# Patient Record
Sex: FEMALE | Race: WHITE | NOT HISPANIC OR LATINO | Employment: UNEMPLOYED
[De-identification: names, ages, dates, MRNs, and addresses within clinical notes are randomized per-mention and may not be internally consistent; named-entity substitution may affect disease eponyms.]

---

## 2017-07-08 ENCOUNTER — HEALTH MAINTENANCE LETTER (OUTPATIENT)
Age: 52
End: 2017-07-08

## 2019-10-04 ENCOUNTER — HEALTH MAINTENANCE LETTER (OUTPATIENT)
Age: 54
End: 2019-10-04

## 2020-02-10 ENCOUNTER — HEALTH MAINTENANCE LETTER (OUTPATIENT)
Age: 55
End: 2020-02-10

## 2020-11-14 ENCOUNTER — HEALTH MAINTENANCE LETTER (OUTPATIENT)
Age: 55
End: 2020-11-14

## 2021-03-28 ENCOUNTER — HEALTH MAINTENANCE LETTER (OUTPATIENT)
Age: 56
End: 2021-03-28

## 2021-09-12 ENCOUNTER — HEALTH MAINTENANCE LETTER (OUTPATIENT)
Age: 56
End: 2021-09-12

## 2022-04-24 ENCOUNTER — HEALTH MAINTENANCE LETTER (OUTPATIENT)
Age: 57
End: 2022-04-24

## 2022-10-30 ENCOUNTER — HEALTH MAINTENANCE LETTER (OUTPATIENT)
Age: 57
End: 2022-10-30

## 2023-02-23 ENCOUNTER — APPOINTMENT (OUTPATIENT)
Dept: GENERAL RADIOLOGY | Facility: CLINIC | Age: 58
DRG: 871 | End: 2023-02-23
Attending: EMERGENCY MEDICINE
Payer: MEDICARE

## 2023-02-23 ENCOUNTER — APPOINTMENT (OUTPATIENT)
Dept: CT IMAGING | Facility: CLINIC | Age: 58
DRG: 871 | End: 2023-02-23
Attending: EMERGENCY MEDICINE
Payer: MEDICARE

## 2023-02-23 ENCOUNTER — HOSPITAL ENCOUNTER (INPATIENT)
Facility: CLINIC | Age: 58
LOS: 4 days | Discharge: HOME | DRG: 871 | End: 2023-02-27
Attending: EMERGENCY MEDICINE | Admitting: INTERNAL MEDICINE
Payer: MEDICARE

## 2023-02-23 DIAGNOSIS — A41.9 SEPSIS, DUE TO UNSPECIFIED ORGANISM, UNSPECIFIED WHETHER ACUTE ORGAN DYSFUNCTION PRESENT (H): Primary | ICD-10-CM

## 2023-02-23 DIAGNOSIS — N39.0 URINARY TRACT INFECTION WITHOUT HEMATURIA, SITE UNSPECIFIED: ICD-10-CM

## 2023-02-23 DIAGNOSIS — N10 PYELONEPHRITIS, ACUTE: ICD-10-CM

## 2023-02-23 DIAGNOSIS — N90.5 VULVAR ATROPHY: ICD-10-CM

## 2023-02-23 LAB
ALBUMIN SERPL BCG-MCNC: 3.7 G/DL (ref 3.5–5.2)
ALBUMIN UR-MCNC: NEGATIVE MG/DL
ALP SERPL-CCNC: 110 U/L (ref 35–104)
ALT SERPL W P-5'-P-CCNC: 15 U/L (ref 10–35)
ANION GAP SERPL CALCULATED.3IONS-SCNC: 15 MMOL/L (ref 7–15)
APPEARANCE UR: CLEAR
AST SERPL W P-5'-P-CCNC: 20 U/L (ref 10–35)
BACTERIA #/AREA URNS HPF: ABNORMAL /HPF
BASE EXCESS BLDV CALC-SCNC: 2.1 MMOL/L (ref -7.7–1.9)
BASOPHILS # BLD AUTO: 0.1 10E3/UL (ref 0–0.2)
BASOPHILS NFR BLD AUTO: 0 %
BILIRUB SERPL-MCNC: 0.7 MG/DL
BILIRUB UR QL STRIP: NEGATIVE
BUN SERPL-MCNC: 17.9 MG/DL (ref 6–20)
CALCIUM SERPL-MCNC: 9 MG/DL (ref 8.6–10)
CHLORIDE SERPL-SCNC: 99 MMOL/L (ref 98–107)
COLOR UR AUTO: YELLOW
CREAT SERPL-MCNC: 1.09 MG/DL (ref 0.51–0.95)
DEPRECATED HCO3 PLAS-SCNC: 24 MMOL/L (ref 22–29)
EOSINOPHIL # BLD AUTO: 0 10E3/UL (ref 0–0.7)
EOSINOPHIL NFR BLD AUTO: 0 %
ERYTHROCYTE [DISTWIDTH] IN BLOOD BY AUTOMATED COUNT: 13.5 % (ref 10–15)
FLUAV RNA SPEC QL NAA+PROBE: NEGATIVE
FLUBV RNA RESP QL NAA+PROBE: NEGATIVE
GFR SERPL CREATININE-BSD FRML MDRD: 60 ML/MIN/1.73M2
GLUCOSE BLDC GLUCOMTR-MCNC: 221 MG/DL (ref 70–99)
GLUCOSE BLDC GLUCOMTR-MCNC: 240 MG/DL (ref 70–99)
GLUCOSE BLDC GLUCOMTR-MCNC: 266 MG/DL (ref 70–99)
GLUCOSE BLDC GLUCOMTR-MCNC: 290 MG/DL (ref 70–99)
GLUCOSE SERPL-MCNC: 290 MG/DL (ref 70–99)
GLUCOSE UR STRIP-MCNC: >=1000 MG/DL
HCO3 BLDV-SCNC: 26 MMOL/L (ref 21–28)
HCT VFR BLD AUTO: 41.2 % (ref 35–47)
HGB BLD-MCNC: 13.7 G/DL (ref 11.7–15.7)
HGB UR QL STRIP: ABNORMAL
HOLD SPECIMEN: NORMAL
IMM GRANULOCYTES # BLD: 0.8 10E3/UL
IMM GRANULOCYTES NFR BLD: 2 %
KETONES UR STRIP-MCNC: 10 MG/DL
LACTATE SERPL-SCNC: 1.9 MMOL/L (ref 0.7–2)
LACTATE SERPL-SCNC: 2.2 MMOL/L (ref 0.7–2)
LEUKOCYTE ESTERASE UR QL STRIP: ABNORMAL
LIPASE SERPL-CCNC: 11 U/L (ref 13–60)
LYMPHOCYTES # BLD AUTO: 1.6 10E3/UL (ref 0.8–5.3)
LYMPHOCYTES NFR BLD AUTO: 5 %
MAGNESIUM SERPL-MCNC: 1.4 MG/DL (ref 1.7–2.3)
MCH RBC QN AUTO: 29.3 PG (ref 26.5–33)
MCHC RBC AUTO-ENTMCNC: 33.3 G/DL (ref 31.5–36.5)
MCV RBC AUTO: 88 FL (ref 78–100)
MONOCYTES # BLD AUTO: 0.9 10E3/UL (ref 0–1.3)
MONOCYTES NFR BLD AUTO: 3 %
MUCOUS THREADS #/AREA URNS LPF: PRESENT /LPF
NEUTROPHILS # BLD AUTO: 31.6 10E3/UL (ref 1.6–8.3)
NEUTROPHILS NFR BLD AUTO: 90 %
NITRATE UR QL: NEGATIVE
NRBC # BLD AUTO: 0 10E3/UL
NRBC BLD AUTO-RTO: 0 /100
O2/TOTAL GAS SETTING VFR VENT: 0 %
OXYHGB MFR BLDV: 79 % (ref 70–75)
PCO2 BLDV: 37 MM HG (ref 40–50)
PH BLDV: 7.45 [PH] (ref 7.32–7.43)
PH UR STRIP: 5 [PH] (ref 5–7)
PLATELET # BLD AUTO: 229 10E3/UL (ref 150–450)
PO2 BLDV: 43 MM HG (ref 25–47)
POTASSIUM SERPL-SCNC: 3.6 MMOL/L (ref 3.4–5.3)
PROT SERPL-MCNC: 7 G/DL (ref 6.4–8.3)
RBC # BLD AUTO: 4.67 10E6/UL (ref 3.8–5.2)
RBC URINE: 2 /HPF
RSV RNA SPEC NAA+PROBE: NEGATIVE
SARS-COV-2 RNA RESP QL NAA+PROBE: NEGATIVE
SODIUM SERPL-SCNC: 138 MMOL/L (ref 136–145)
SP GR UR STRIP: 1.03 (ref 1–1.03)
SQUAMOUS EPITHELIAL: 1 /HPF
UROBILINOGEN UR STRIP-MCNC: NORMAL MG/DL
WBC # BLD AUTO: 35.1 10E3/UL (ref 4–11)
WBC URINE: 33 /HPF

## 2023-02-23 PROCEDURE — 87086 URINE CULTURE/COLONY COUNT: CPT | Performed by: EMERGENCY MEDICINE

## 2023-02-23 PROCEDURE — 71046 X-RAY EXAM CHEST 2 VIEWS: CPT

## 2023-02-23 PROCEDURE — 99285 EMERGENCY DEPT VISIT HI MDM: CPT | Mod: 25,CS

## 2023-02-23 PROCEDURE — 258N000003 HC RX IP 258 OP 636: Performed by: PHYSICIAN ASSISTANT

## 2023-02-23 PROCEDURE — G1010 CDSM STANSON: HCPCS

## 2023-02-23 PROCEDURE — 36415 COLL VENOUS BLD VENIPUNCTURE: CPT | Performed by: EMERGENCY MEDICINE

## 2023-02-23 PROCEDURE — 82962 GLUCOSE BLOOD TEST: CPT

## 2023-02-23 PROCEDURE — 250N000013 HC RX MED GY IP 250 OP 250 PS 637: Performed by: PHYSICIAN ASSISTANT

## 2023-02-23 PROCEDURE — 99222 1ST HOSP IP/OBS MODERATE 55: CPT | Mod: AI | Performed by: PHYSICIAN ASSISTANT

## 2023-02-23 PROCEDURE — 83735 ASSAY OF MAGNESIUM: CPT | Performed by: EMERGENCY MEDICINE

## 2023-02-23 PROCEDURE — C9803 HOPD COVID-19 SPEC COLLECT: HCPCS

## 2023-02-23 PROCEDURE — 82805 BLOOD GASES W/O2 SATURATION: CPT | Performed by: EMERGENCY MEDICINE

## 2023-02-23 PROCEDURE — 96365 THER/PROPH/DIAG IV INF INIT: CPT | Mod: 59

## 2023-02-23 PROCEDURE — 258N000003 HC RX IP 258 OP 636: Performed by: EMERGENCY MEDICINE

## 2023-02-23 PROCEDURE — 83690 ASSAY OF LIPASE: CPT | Performed by: EMERGENCY MEDICINE

## 2023-02-23 PROCEDURE — 87637 SARSCOV2&INF A&B&RSV AMP PRB: CPT | Performed by: EMERGENCY MEDICINE

## 2023-02-23 PROCEDURE — 96375 TX/PRO/DX INJ NEW DRUG ADDON: CPT

## 2023-02-23 PROCEDURE — 250N000011 HC RX IP 250 OP 636: Performed by: EMERGENCY MEDICINE

## 2023-02-23 PROCEDURE — 70450 CT HEAD/BRAIN W/O DYE: CPT | Mod: MF

## 2023-02-23 PROCEDURE — 83605 ASSAY OF LACTIC ACID: CPT | Performed by: EMERGENCY MEDICINE

## 2023-02-23 PROCEDURE — 74177 CT ABD & PELVIS W/CONTRAST: CPT | Mod: MG

## 2023-02-23 PROCEDURE — 120N000001 HC R&B MED SURG/OB

## 2023-02-23 PROCEDURE — 87149 DNA/RNA DIRECT PROBE: CPT | Performed by: EMERGENCY MEDICINE

## 2023-02-23 PROCEDURE — 85025 COMPLETE CBC W/AUTO DIFF WBC: CPT | Performed by: EMERGENCY MEDICINE

## 2023-02-23 PROCEDURE — 81001 URINALYSIS AUTO W/SCOPE: CPT | Performed by: EMERGENCY MEDICINE

## 2023-02-23 PROCEDURE — 87077 CULTURE AEROBIC IDENTIFY: CPT | Performed by: EMERGENCY MEDICINE

## 2023-02-23 PROCEDURE — 250N000011 HC RX IP 250 OP 636: Performed by: PHYSICIAN ASSISTANT

## 2023-02-23 PROCEDURE — 80053 COMPREHEN METABOLIC PANEL: CPT | Performed by: EMERGENCY MEDICINE

## 2023-02-23 PROCEDURE — 250N000013 HC RX MED GY IP 250 OP 250 PS 637: Performed by: EMERGENCY MEDICINE

## 2023-02-23 PROCEDURE — 250N000009 HC RX 250: Performed by: EMERGENCY MEDICINE

## 2023-02-23 RX ORDER — NICOTINE POLACRILEX 4 MG
15-30 LOZENGE BUCCAL
Status: DISCONTINUED | OUTPATIENT
Start: 2023-02-23 | End: 2023-02-27 | Stop reason: HOSPADM

## 2023-02-23 RX ORDER — DULOXETIN HYDROCHLORIDE 60 MG/1
60 CAPSULE, DELAYED RELEASE ORAL DAILY
COMMUNITY

## 2023-02-23 RX ORDER — DIPHENHYDRAMINE HYDROCHLORIDE 50 MG/ML
25 INJECTION INTRAMUSCULAR; INTRAVENOUS ONCE
Status: COMPLETED | OUTPATIENT
Start: 2023-02-23 | End: 2023-02-23

## 2023-02-23 RX ORDER — MORPHINE SULFATE 4 MG/ML
4 INJECTION, SOLUTION INTRAMUSCULAR; INTRAVENOUS ONCE
Status: COMPLETED | OUTPATIENT
Start: 2023-02-23 | End: 2023-02-23

## 2023-02-23 RX ORDER — SODIUM CHLORIDE 9 MG/ML
INJECTION, SOLUTION INTRAVENOUS CONTINUOUS
Status: DISCONTINUED | OUTPATIENT
Start: 2023-02-23 | End: 2023-02-24

## 2023-02-23 RX ORDER — DEXTROSE MONOHYDRATE 25 G/50ML
25-50 INJECTION, SOLUTION INTRAVENOUS
Status: DISCONTINUED | OUTPATIENT
Start: 2023-02-23 | End: 2023-02-27 | Stop reason: HOSPADM

## 2023-02-23 RX ORDER — OXYCODONE HYDROCHLORIDE 5 MG/1
5 TABLET ORAL EVERY 4 HOURS PRN
Status: DISCONTINUED | OUTPATIENT
Start: 2023-02-23 | End: 2023-02-25

## 2023-02-23 RX ORDER — ONDANSETRON 4 MG/1
4 TABLET, ORALLY DISINTEGRATING ORAL EVERY 6 HOURS PRN
Status: DISCONTINUED | OUTPATIENT
Start: 2023-02-23 | End: 2023-02-27 | Stop reason: HOSPADM

## 2023-02-23 RX ORDER — AMOXICILLIN 250 MG
2 CAPSULE ORAL 2 TIMES DAILY PRN
Status: DISCONTINUED | OUTPATIENT
Start: 2023-02-23 | End: 2023-02-27 | Stop reason: HOSPADM

## 2023-02-23 RX ORDER — METOCLOPRAMIDE HYDROCHLORIDE 5 MG/ML
10 INJECTION INTRAMUSCULAR; INTRAVENOUS ONCE
Status: COMPLETED | OUTPATIENT
Start: 2023-02-23 | End: 2023-02-23

## 2023-02-23 RX ORDER — CEFTRIAXONE 1 G/1
1 INJECTION, POWDER, FOR SOLUTION INTRAMUSCULAR; INTRAVENOUS ONCE
Status: COMPLETED | OUTPATIENT
Start: 2023-02-23 | End: 2023-02-23

## 2023-02-23 RX ORDER — IBUPROFEN 200 MG
600 TABLET ORAL PRN
COMMUNITY

## 2023-02-23 RX ORDER — LIDOCAINE 40 MG/G
CREAM TOPICAL
Status: DISCONTINUED | OUTPATIENT
Start: 2023-02-23 | End: 2023-02-27 | Stop reason: HOSPADM

## 2023-02-23 RX ORDER — PREGABALIN 100 MG/1
100 CAPSULE ORAL 2 TIMES DAILY
Status: DISCONTINUED | OUTPATIENT
Start: 2023-02-23 | End: 2023-02-27 | Stop reason: HOSPADM

## 2023-02-23 RX ORDER — DULOXETIN HYDROCHLORIDE 60 MG/1
60 CAPSULE, DELAYED RELEASE ORAL DAILY
Status: DISCONTINUED | OUTPATIENT
Start: 2023-02-23 | End: 2023-02-27 | Stop reason: HOSPADM

## 2023-02-23 RX ORDER — ACETAMINOPHEN 325 MG/1
650 TABLET ORAL EVERY 6 HOURS PRN
Status: DISCONTINUED | OUTPATIENT
Start: 2023-02-23 | End: 2023-02-27 | Stop reason: HOSPADM

## 2023-02-23 RX ORDER — IOPAMIDOL 755 MG/ML
500 INJECTION, SOLUTION INTRAVASCULAR ONCE
Status: COMPLETED | OUTPATIENT
Start: 2023-02-23 | End: 2023-02-23

## 2023-02-23 RX ORDER — AMOXICILLIN 250 MG
1 CAPSULE ORAL 2 TIMES DAILY PRN
Status: DISCONTINUED | OUTPATIENT
Start: 2023-02-23 | End: 2023-02-27 | Stop reason: HOSPADM

## 2023-02-23 RX ORDER — ACETAMINOPHEN 325 MG/1
975 TABLET ORAL ONCE
Status: COMPLETED | OUTPATIENT
Start: 2023-02-23 | End: 2023-02-23

## 2023-02-23 RX ORDER — ACETAMINOPHEN 650 MG/1
650 SUPPOSITORY RECTAL EVERY 6 HOURS PRN
Status: DISCONTINUED | OUTPATIENT
Start: 2023-02-23 | End: 2023-02-27 | Stop reason: HOSPADM

## 2023-02-23 RX ORDER — ONDANSETRON 2 MG/ML
4 INJECTION INTRAMUSCULAR; INTRAVENOUS EVERY 6 HOURS PRN
Status: DISCONTINUED | OUTPATIENT
Start: 2023-02-23 | End: 2023-02-27 | Stop reason: HOSPADM

## 2023-02-23 RX ORDER — HYDROMORPHONE HCL IN WATER/PF 6 MG/30 ML
0.2 PATIENT CONTROLLED ANALGESIA SYRINGE INTRAVENOUS
Status: DISCONTINUED | OUTPATIENT
Start: 2023-02-23 | End: 2023-02-25

## 2023-02-23 RX ORDER — CEFTRIAXONE 1 G/1
1 INJECTION, POWDER, FOR SOLUTION INTRAMUSCULAR; INTRAVENOUS EVERY 24 HOURS
Status: DISCONTINUED | OUTPATIENT
Start: 2023-02-24 | End: 2023-02-24

## 2023-02-23 RX ORDER — POLYETHYLENE GLYCOL 3350 17 G/17G
17 POWDER, FOR SOLUTION ORAL DAILY PRN
Status: DISCONTINUED | OUTPATIENT
Start: 2023-02-23 | End: 2023-02-27 | Stop reason: HOSPADM

## 2023-02-23 RX ORDER — METFORMIN HCL 500 MG
1000 TABLET, EXTENDED RELEASE 24 HR ORAL
COMMUNITY

## 2023-02-23 RX ORDER — LORAZEPAM 2 MG/ML
.5-1 INJECTION INTRAMUSCULAR EVERY 4 HOURS PRN
Status: DISCONTINUED | OUTPATIENT
Start: 2023-02-23 | End: 2023-02-25

## 2023-02-23 RX ORDER — MAGNESIUM SULFATE HEPTAHYDRATE 40 MG/ML
2 INJECTION, SOLUTION INTRAVENOUS ONCE
Status: COMPLETED | OUTPATIENT
Start: 2023-02-23 | End: 2023-02-23

## 2023-02-23 RX ADMIN — DIPHENHYDRAMINE HYDROCHLORIDE 25 MG: 50 INJECTION INTRAMUSCULAR; INTRAVENOUS at 10:53

## 2023-02-23 RX ADMIN — PREGABALIN 100 MG: 100 CAPSULE ORAL at 20:27

## 2023-02-23 RX ADMIN — SODIUM CHLORIDE, POTASSIUM CHLORIDE, SODIUM LACTATE AND CALCIUM CHLORIDE 1000 ML: 600; 310; 30; 20 INJECTION, SOLUTION INTRAVENOUS at 10:34

## 2023-02-23 RX ADMIN — SODIUM CHLORIDE: 9 INJECTION, SOLUTION INTRAVENOUS at 16:21

## 2023-02-23 RX ADMIN — MAGNESIUM SULFATE HEPTAHYDRATE 2 G: 2 INJECTION, SOLUTION INTRAVENOUS at 11:01

## 2023-02-23 RX ADMIN — IOPAMIDOL 91 ML: 755 INJECTION, SOLUTION INTRAVENOUS at 11:12

## 2023-02-23 RX ADMIN — DULOXETINE HYDROCHLORIDE 60 MG: 60 CAPSULE, DELAYED RELEASE ORAL at 16:14

## 2023-02-23 RX ADMIN — METOCLOPRAMIDE HYDROCHLORIDE 10 MG: 5 INJECTION INTRAMUSCULAR; INTRAVENOUS at 10:54

## 2023-02-23 RX ADMIN — MORPHINE SULFATE 4 MG: 4 INJECTION, SOLUTION INTRAMUSCULAR; INTRAVENOUS at 12:14

## 2023-02-23 RX ADMIN — EMPAGLIFLOZIN 10 MG: 10 TABLET, FILM COATED ORAL at 16:14

## 2023-02-23 RX ADMIN — HYDROMORPHONE HYDROCHLORIDE 0.2 MG: 0.2 INJECTION, SOLUTION INTRAMUSCULAR; INTRAVENOUS; SUBCUTANEOUS at 22:49

## 2023-02-23 RX ADMIN — CEFTRIAXONE 1 G: 1 INJECTION, POWDER, FOR SOLUTION INTRAMUSCULAR; INTRAVENOUS at 11:56

## 2023-02-23 RX ADMIN — SODIUM CHLORIDE 63 ML: 9 INJECTION, SOLUTION INTRAVENOUS at 11:12

## 2023-02-23 RX ADMIN — ACETAMINOPHEN 975 MG: 325 TABLET ORAL at 13:09

## 2023-02-23 ASSESSMENT — ENCOUNTER SYMPTOMS
NECK PAIN: 0
CHEST TIGHTNESS: 0
DIZZINESS: 1
VOMITING: 1
RHINORRHEA: 1
EYE PAIN: 0
ABDOMINAL PAIN: 1
HEMATURIA: 0
NAUSEA: 1
CHILLS: 1
BACK PAIN: 1
PALPITATIONS: 0
FEVER: 0
SHORTNESS OF BREATH: 0
HEADACHES: 1
DIARRHEA: 0
DYSURIA: 0
MYALGIAS: 1
COLOR CHANGE: 0
NECK STIFFNESS: 0
COUGH: 0

## 2023-02-23 ASSESSMENT — ACTIVITIES OF DAILY LIVING (ADL)
ADLS_ACUITY_SCORE: 35
ADLS_ACUITY_SCORE: 23
DIFFICULTY_EATING/SWALLOWING: NO
TOILETING_ISSUES: NO
DOING_ERRANDS_INDEPENDENTLY_DIFFICULTY: NO
ADLS_ACUITY_SCORE: 22
ADLS_ACUITY_SCORE: 35
WALKING_OR_CLIMBING_STAIRS_DIFFICULTY: NO
DRESSING/BATHING_DIFFICULTY: NO
ADLS_ACUITY_SCORE: 35

## 2023-02-23 NOTE — ED TRIAGE NOTES
PT arrives via EMS from home, EMS reports that pt has been having N/V/D, lower back pain, headache, dizziness since Tuesday. PT BEFAST negative and VSS

## 2023-02-23 NOTE — UTILIZATION REVIEW
Admission Status; Secondary Review Determination       Under the authority of the Utilization Management Committee, the utilization review process indicated a secondary review on the above patient. The review outcome is based on review of the medical records, discussions with staff, and applying clinical experience noted on the date of the review.     (x) Inpatient Status Appropriate - This patient's medical care is consistent with medical management for inpatient care and reasonable inpatient medical practice.     RATIONALE FOR DETERMINATION     Patient requires inpatient admission versus short stay observation or outpatient treatment for the following reasons:  57 year old female with dm2, fibromyalgia, who was admitted with LBP found to have pyelonephritis with right hydronephrosis and mild acute kidney injury with creatinine of 1.1 with last of 0.75. Of note, her white cell count was 35 on admission and she was tachycardic, but note febrile. There is concern for sepsis from the pyelonephritis. Given this, > 2 midnights is expected and would continue inpatient care.    The expected length of stay at the time of admission was more than 2 nights because of the severity of illness, intensity of service provided, and risk for adverse outcome. Inpatient admission is appropriate.         This document was produced using voice recognition software       The information on this document is developed by the utilization review team in order for the business office to ensure compliance. This only denotes the appropriateness of proper admission status and does not reflect the quality of care rendered.   The definitions of Inpatient Status and Observation Status used in making the determination above are those provided in the CMS Coverage Manual, Chapter 1 and Chapter 6, section 70.4.   Sincerely,   Stepan Phillips DO  Physician Advisor  [facility].

## 2023-02-23 NOTE — PLAN OF CARE
Patient Transfer Information  Patient connected to monitoring equipment on arrival: N/A    Patient connected to wall oxygen on arrival: Yes    Belongings: Transferred with patient    Safety check completed: Yes

## 2023-02-23 NOTE — ED NOTES
[facility]  ED Nurse Handoff Report    Britt Holder is a 57 year old female   ED Chief complaint: Nausea, Vomiting, & Diarrhea and Headache  . ED Diagnosis:   Final diagnoses:   Sepsis, due to unspecified organism, unspecified whether acute organ dysfunction present (H)   Pyelonephritis, acute   Urinary tract infection without hematuria, site unspecified     Allergies:   Allergies   Allergen Reactions     Blood-Group Specific Substance      Patient has a Warm Auto Antibody. Blood products may be delayed. Draw 2 purple and 1 red tube for each Type and Screen and/or Type and Crossmatch order.     Compazine      Cortisone Hives     Including injectable     Cortizone      Dicyclomine      Gabapentin      Imitrex [Sumatriptan] Swelling     Sulfa Drugs Rash       Code Status: Full Code  Activity level - Baseline/Home:  Independent. Activity Level - Current:   Independent. Lift room needed: No. Bariatric: No   Needed: No   Isolation: No. Infection: Not Applicable.     Vital Signs:   Vitals:    02/23/23 0934 02/23/23 1036 02/23/23 1257 02/23/23 1310   BP: 115/73   (!) 145/78   Pulse: 111   (!) 126   Resp: 20      Temp:  98.6  F (37  C) 99.9  F (37.7  C)    TempSrc:  Oral Oral    SpO2: 95%   95%       Cardiac Rhythm:  ,      Pain level:    Patient confused: No. Patient Falls Risk: No.   Elimination Status: Has voided   Patient Report - Initial Complaint: Nausea, Vomiting, Diarrhea and Headache. Focused Assessment: Britt Holder is a 57 year old female with a history of migraines, hyperglycemia, fibromyalgia, and depression who presents with nausea, vomiting, body aches, and abdominal pain.  Patient states that since Tuesday, she has had headache, nausea, vertigo, and low back/body aches.  Patient states that she has not been able to keep down any fluids.  Patient endorses associated abdominal pain.  Patient denies any dysuria/hematuria.  Patient denies any diarrhea.  Patient denies any cough, but  endorses phlegm production.  Patient endorses transient history of shortness of breath a few days ago, but this denies any shortness of breath at this time.  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   Tests Performed: EKGs,LABs, IMAGING. Abnormal Results:   Labs Ordered and Resulted from Time of ED Arrival to Time of ED Departure   COMPREHENSIVE METABOLIC PANEL - Abnormal       Result Value    Sodium 138      Potassium 3.6      Chloride 99      Carbon Dioxide (CO2) 24      Anion Gap 15      Urea Nitrogen 17.9      Creatinine 1.09 (*)     Calcium 9.0      Glucose 290 (*)     Alkaline Phosphatase 110 (*)     AST 20      ALT 15      Protein Total 7.0      Albumin 3.7      Bilirubin Total 0.7      GFR Estimate 60 (*)    LIPASE - Abnormal    Lipase 11 (*)    MAGNESIUM - Abnormal    Magnesium 1.4 (*)    ROUTINE UA WITH MICROSCOPIC REFLEX TO CULTURE - Abnormal    Color Urine Yellow      Appearance Urine Clear      Glucose Urine >=1000 (*)     Bilirubin Urine Negative      Ketones Urine 10 (*)     Specific Gravity Urine 1.032      Blood Urine Trace (*)     pH Urine 5.0      Protein Albumin Urine Negative      Urobilinogen Urine Normal      Nitrite Urine Negative      Leukocyte Esterase Urine Small (*)     Bacteria Urine Few (*)     Mucus Urine Present (*)     RBC Urine 2      WBC Urine 33 (*)     Squamous Epithelials Urine 1     GLUCOSE BY METER - Abnormal    GLUCOSE BY METER POCT 266 (*)    CBC WITH PLATELETS AND DIFFERENTIAL - Abnormal    WBC Count 35.1 (*)     RBC Count 4.67      Hemoglobin 13.7      Hematocrit 41.2      MCV 88      MCH 29.3      MCHC 33.3      RDW 13.5      Platelet Count 229      % Neutrophils 90      % Lymphocytes 5      % Monocytes 3      % Eosinophils 0      % Basophils 0      % Immature Granulocytes 2      NRBCs per 100 WBC 0      Absolute Neutrophils 31.6 (*)     Absolute Lymphocytes 1.6      Absolute Monocytes 0.9      Absolute Eosinophils 0.0      Absolute  Basophils 0.1      Absolute Immature Granulocytes 0.8 (*)     Absolute NRBCs 0.0     LACTIC ACID WHOLE BLOOD - Abnormal    Lactic Acid 2.2 (*)    BLOOD GAS VENOUS WITH OXYHEMOGLOBIN - Abnormal    pH Venous 7.45 (*)     pCO2 Venous 37 (*)     pO2 Venous 43      Bicarbonate Venous 26      FIO2 0      Oxyhemoglobin Venous 79 (*)     Base Excess/Deficit (+/-) 2.1 (*)    INFLUENZA A/B & SARS-COV2 PCR MULTIPLEX - Normal    Influenza A PCR Negative      Influenza B PCR Negative      RSV PCR Negative      SARS CoV2 PCR Negative     LACTIC ACID WHOLE BLOOD - Normal    Lactic Acid 1.9     GLUCOSE MONITOR NURSING POCT   GLUCOSE MONITOR NURSING POCT   GLUCOSE MONITOR NURSING POCT   GLUCOSE MONITOR NURSING POCT   BLOOD CULTURE   BLOOD CULTURE   URINE CULTURE      XR Chest 2 Views   Final Result   IMPRESSION: There are no acute infiltrates. The cardiac silhouette is   not enlarged. Pulmonary vasculature is unremarkable.      CATHY SILVA MD            SYSTEM ID:  [id number]      CT Head w/o Contrast   Final Result   IMPRESSION: Diffuse cerebral volume loss and cerebral white matter   changes consistent with chronic small vessel ischemic disease. No   evidence for acute intracranial pathology.             Radiation dose for this scan was reduced using automated exposure   control, adjustment of the mA and/or kV according to patient size, or   iterative reconstruction technique      MARZENA HOLDEN MD            SYSTEM ID:  [id number]      CT Abdomen Pelvis w Contrast   Final Result   IMPRESSION:    1.  Right-sided perinephric stranding, mild hydronephrosis, and   urothelial enhancement concerning for pyelonephritis, clinical   correlation needed. Alternatively, this appearance could be related to   recently passed stone.      CATHY SILVA MD            SYSTEM ID:  [id number]          Treatments provided: See MAR  Family Comments: N/A  OBS brochure/video discussed/provided to patient:  Yes  ED Medications:   Medications   cefTRIAXone  (ROCEPHIN) 1 g vial to attach to  mL bag for ADULTS or NS 50 mL bag for PEDS (has no administration in time range)   lidocaine 1 % 0.1-1 mL (has no administration in time range)   lidocaine (LMX4) cream (has no administration in time range)   sodium chloride (PF) 0.9% PF flush 3 mL (has no administration in time range)   sodium chloride (PF) 0.9% PF flush 3 mL (has no administration in time range)   melatonin tablet 1 mg (has no administration in time range)   glucose gel 15-30 g (has no administration in time range)     Or   dextrose 50 % injection 25-50 mL (has no administration in time range)     Or   glucagon injection 1 mg (has no administration in time range)   sodium chloride 0.9% infusion (has no administration in time range)   acetaminophen (TYLENOL) tablet 650 mg (has no administration in time range)     Or   acetaminophen (TYLENOL) Suppository 650 mg (has no administration in time range)   oxyCODONE (ROXICODONE) tablet 5 mg (has no administration in time range)   HYDROmorphone (DILAUDID) injection 0.2 mg (has no administration in time range)   senna-docusate (SENOKOT-S/PERICOLACE) 8.6-50 MG per tablet 1 tablet (has no administration in time range)     Or   senna-docusate (SENOKOT-S/PERICOLACE) 8.6-50 MG per tablet 2 tablet (has no administration in time range)   polyethylene glycol (MIRALAX) Packet 17 g (has no administration in time range)   ondansetron (ZOFRAN ODT) ODT tab 4 mg (has no administration in time range)     Or   ondansetron (ZOFRAN) injection 4 mg (has no administration in time range)   insulin aspart (NovoLOG) injection (RAPID ACTING) (has no administration in time range)   insulin aspart (NovoLOG) injection (RAPID ACTING) (has no administration in time range)   LORazepam (ATIVAN) injection 0.5-1 mg (has no administration in time range)   lactated ringers BOLUS 1,000 mL (0 mLs Intravenous Stopped 2/23/23 1130)   metoclopramide (REGLAN) injection 10 mg (10 mg Intravenous $Given 2/23/23  1054)   diphenhydrAMINE (BENADRYL) injection 25 mg (25 mg Intravenous $Given 2/23/23 1053)   cefTRIAXone (ROCEPHIN) 1 g vial to attach to  mL bag for ADULTS or NS 50 mL bag for PEDS (0 g Intravenous Stopped 2/23/23 1230)   magnesium sulfate 2 g in 50 mL sterile water intermittent infusion (0 g Intravenous Stopped 2/23/23 1201)   CT SCAN FLUSH (63 mLs Intravenous $Given 2/23/23 1112)   iopamidol (ISOVUE-370) solution 500 mL (91 mLs Intravenous $Given 2/23/23 1112)   morphine (PF) injection 4 mg (4 mg Intravenous $Given 2/23/23 1214)   acetaminophen (TYLENOL) tablet 975 mg (975 mg Oral $Given 2/23/23 1309)     Drips infusing:  No  For the majority of the shift, the patient's behavior Green. Interventions performed were none.    Sepsis treatment initiated: No     Patient tested for COVID 19 prior to admission: YES    ED Nurse Name/Phone Number: Kervin Reilly RN,   1:58 PM    RECEIVING UNIT ED HANDOFF REVIEW    Above ED Nurse Handoff Report was reviewed: Yes  Reviewed by: Kourtney Arthur RN on February 23, 2023 at 4:23 PM

## 2023-02-23 NOTE — H&P
[facility]    History and Physical - Hospitalist Service       Date of Admission:  2/23/2023    Assessment & Plan      Britt Holder is a 57 year old female with PMHx significant for DM type II, fibromyalgia, IBS and hx of migraines, who was admitted on 2/23/2023 with pyelonephritis.     1. Pyelonephritis   Sepsis due to UTI  2 days of low back pain that radiated towards the front, nausea, vomiting and headache. Unable to tolerate oral intake. Notes severe ha but different than typical migraines. Notes some vertigo as well. Hx of UTIs.   Tachycardic here but afebrile. Mild Marco noted, Cr 1.09, previously within normal ranges. Lactic acid 2.2 and WBC 35.1. UA small LE with 33 WBCs and few bacteria, trace blood. Urine and blood cultures sent. CT abd/pelvis shows R perinephric stranding and mild hydronephrosis, evidence consistent with recently passed stone. Given IV Rocephin  - admit to inpatient  - continue IV Rocephin  - continue IVFs and supportive cares  - pain control  - monitor culture data  - given significant WBC and equivocal UA with imaging findings that could be consistent with stone passage and CT was done with contrast, will consult Urology     Addendum: Urology reviewed images, no stone seen. Consider straight cath to empty bladder if retaining. Urology will see in AM unless pt becomes unstable.     - recheck labs in AM  - if remains vertiginous, consider advanced imaging    2. MARCO  Due to UTI and possibly recent obstructing stone per CT findings.   - IVFs  - recheck in AM  3. Leukocytosis  Severe, 35.1 with left shift. Likely due to #1 only but will trend to ensure it improves  4. DM type II  Hyperglycemic, 290, likely due to infection. Managed with Metformin and Jardiance at home.   - hold Metformin until lactic acid improves, resume Jardiance   - cover with sliding scale insulin   - hypoglycemia protocol  5. Fibromyalgia  Resume home Cymbalta and Lyrica  6. Hypomagnesemia   1.4,  likely due to vomiting.   - replace per protocol     Diet:   Regular diet  DVT Prophylaxis: Pneumatic Compression Devices  Baldwin Catheter: Not present  Lines: None     Cardiac Monitoring: None  Code Status:   Full code    Clinically Significant Risk Factors Present on Admission            # Hypomagnesemia: Lowest Mg = 1.4 mg/dL in last 2 days, will replace as needed                    Disposition Plan      Expected Discharge Date: 02/25/2023                The patient's care was discussed with the Attending Physician, Dr. Spangler, Patient and ED provider, Dr. Montero.    Goldie Torres PA-C  Hospitalist Service  [facility]  Securely message with [employer] (more info)  Text page via [facility] Paging/Directory     ______________________________________________________________________    Chief Complaint   Back pain, nausea, vomiting, ha    History is obtained from the patient    History of Present Illness   Britt Holder is a 57 year old female with PMHx significant for DM type II, fibromyalgia, IBS and hx of migraines, who presents to the ED with multiple symptoms.  She complains most of low back pain and headache.  She notes a 2-day history of low back pain that radiates towards the front with nausea and vomiting.  She has been unable to keep anything down at home.  She also notes a severe headache, she does have a history of migraines but this feels different.  She notes chills with associated shortness of breath but denies measured fever. She also notes vertiginous dizziness. She denies chest pain, diarrhea, dysuria or hematuria.  She does report a history of urinary tract infections.  In the ED, she is tachycardic here but afebrile.  Vital signs otherwise stable.  CMP shows mildly elevated creatinine at 1.09 and glucose elevated at 290, otherwise unremarkable.  Magnesium is mildly low at 1.4. Lactic acid 2.2 and WBC 35.1. UA small LE with 33 WBCs and few bacteria, trace blood. Urine and blood  cultures sent. CT abd/pelvis shows R perinephric stranding and mild hydronephrosis, evidence consistent with recently passed stone.  She was given 1 L LR bolus, 2 g mag sulfate, 25 mg IV Benadryl, 10 mg IV Reglan, 4 mg IV morphine, and 175 mg of p.o. Tylenol, and IV Rocephin.  She will be admitted for further management of sepsis associated with pyelonephritis.      Past Medical History    Past Medical History:   Diagnosis Date     Fibromyalgia      Irritable bowel syndrome      Migraines        Past Surgical History   Past Surgical History:   Procedure Laterality Date     addenoidectomy       APPENDECTOMY       GYN SURGERY      hYSTERECTOMY     HYSTERECTOMY       TONSILLECTOMY         Prior to Admission Medications   Prior to Admission Medications   Prescriptions Last Dose Informant Patient Reported? Taking?   Pregabalin (LYRICA PO)   Yes No   Sig: Take 100 mg by mouth 2 times daily    TRAZODONE HCL PO   Yes No   Sig: Take 50 mg by mouth At Bedtime   venlafaxine (EFFEXOR-ER) 150 MG TB24 24 hr tablet   Yes No   Sig: Take 150 mg by mouth daily (with breakfast)      Facility-Administered Medications: None       tobacco use - 3 per day recently    Physical Exam   Vital Signs: Temp: 98.6  F (37  C) Temp src: Oral BP: 115/73 Pulse: 111   Resp: 20 SpO2: 95 % O2 Device: None (Room air)    Weight: 0 lbs 0 oz    GENERAL:  Comfortable.  PSYCH: pleasant, oriented, No acute distress.  HEENT:  Atraumatic, normocephalic. Normal conjunctiva, normal hearing, and oropharynx is normal.  NECK:  Supple, no neck vein distention  HEART:  Normal S1, S2 with no murmur, no pericardial rub, gallops or S3 or S4.  LUNGS:  Clear to auscultation, normal Respiratory effort. No wheezing, rales or ronchi.  GI:  Soft, normal bowel sounds. R low back/flank tenderness, non distended.   EXTREMITIES:  No pedal edema, +2 pulses bilateral and equal.  SKIN:  Dry to touch, No rash, wound or ulcerations.  NEUROLOGIC:  CN 2-12 intact, BL 5/5 symmetric upper  and lower extremity strength, sensation is intact with no focal deficits.      Medical Decision Making       60 MINUTES SPENT BY ME on the date of service doing chart review, history, exam, documentation & further activities per the note.      Data     I have personally reviewed the following data over the past 24 hrs:    35.1 (H)  \   13.7   / 229     138 99 17.9 /  290 (H)   3.6 24 1.09 (H) \       ALT: 15 AST: 20 AP: 110 (H) TBILI: 0.7   ALB: 3.7 TOT PROTEIN: 7.0 LIPASE: 11 (L)       Procal: N/A CRP: N/A Lactic Acid: 1.9         Imaging results reviewed over the past 24 hrs:   Recent Results (from the past 24 hour(s))   CT Abdomen Pelvis w Contrast    Narrative    CT ABDOMEN AND PELVIS WITH CONTRAST 2/23/2023 11:27 AM    CLINICAL HISTORY: Abdominal pain.     TECHNIQUE: CT scan of the abdomen and pelvis was performed following  injection of IV contrast. Multiplanar reformats were obtained. Dose  reduction techniques were used.  CONTRAST: 91mL Isovue-370    COMPARISON: December 8, 2016    FINDINGS:   LOWER CHEST: Minimal dependent probable atelectasis on the right,  trace pleural fluid on the right. Left lung base clear.    HEPATOBILIARY: Normal contour with no significant mass. No bile duct  dilatation. Calcified gallstones. Moderate hepatic steatosis.    PANCREAS: No significant mass, duct dilatation, or inflammatory  change.    SPLEEN: Normal size.    ADRENAL GLANDS: No significant nodules.    KIDNEYS/BLADDER: Perinephric stranding and urothelial enhancement on  the right with areas of decreased enhancement in the renal parenchyma  concerning for pyelonephritis. Left kidney unremarkable. No  urolithiasis demonstrated.    BOWEL: No obstruction or inflammatory change.    PELVIC ORGANS: No pelvic masses.    ADDITIONAL FINDINGS: No free air. No free fluid. A few mildly  prominent retroperitoneal lymph nodes are noted, but these are similar  to previous and may be reactive in this setting.    MUSCULOSKELETAL: No  frankly destructive bony lesions.      Impression    IMPRESSION:   1.  Right-sided perinephric stranding, mild hydronephrosis, and  urothelial enhancement concerning for pyelonephritis, clinical  correlation needed. Alternatively, this appearance could be related to  recently passed stone.    CATHY SILVA MD         SYSTEM ID:  [id number]   CT Head w/o Contrast    Narrative    CT OF THE HEAD WITHOUT CONTRAST 2/23/2023 11:28 AM     COMPARISON: None    HISTORY: Headache. Acute headache (< three months), no complicating  features.    TECHNIQUE: 5 mm thick axial CT images of the head were acquired  without IV contrast material.    FINDINGS: There is mild diffuse cerebral volume loss. There are subtle  patchy areas of decreased density in the cerebral white matter  bilaterally that are consistent with sequela of chronic small vessel  ischemic disease.     The ventricles and basal cisterns are within normal limits in  configuration given the degree of cerebral volume loss. There is no  midline shift. There are no extra-axial fluid collections.     No intracranial hemorrhage, mass or recent infarct.    The visualized paranasal sinuses are well-aerated. There is no  mastoiditis. There are no fractures of the visualized bones.       Impression    IMPRESSION: Diffuse cerebral volume loss and cerebral white matter  changes consistent with chronic small vessel ischemic disease. No  evidence for acute intracranial pathology.         Radiation dose for this scan was reduced using automated exposure  control, adjustment of the mA and/or kV according to patient size, or  iterative reconstruction technique     XR Chest 2 Views    Narrative    CHEST TWO VIEWS 2/23/2023 11:44 AM     HISTORY: Nausea, vomiting, diarrhea, and headache    COMPARISON: None.       Impression    IMPRESSION: There are no acute infiltrates. The cardiac silhouette is  not enlarged. Pulmonary vasculature is unremarkable.    CATHY SILVA MD         SYSTEM ID:   [id number]

## 2023-02-23 NOTE — PROGRESS NOTES
UROLOGY NOTE    Britt Holder is a 57 year-old female with no prior urologic history who presented to the ER with nausea, vomiting, fever, and headache. Workup was remarkable for tachycardia to the 120s, WBC 35, Cr 1.09 (baseline 0.7), and UA with 33 wbc and small LE. Contrast CT with bilateral duplex collecting systems and mild fullness of right sided renal pelves and perinephric stranging. She was admitted to the medicine service for IV antibiotics for presumed pyelonephritis.     Urology was consulted for recommendations related to her mild right hydronephrosis. No stone is appreciated on her CT scan. Her hydronephrosis may be a result of pyelonephritis itself or 2/2 recently passed stone. Given these findings, urgent stent placement is not indicated if she improves with antibiotics and supportive measures.     Her bladder does not appear overly distended on CT, however a deng catheter may help to ensure her bladder remains decompressed in the setting of an active infection. I will leave this decision to the primary medicine team.     Please alert urology if she decompensates overnight as urgent stent placement may be required.     Formal consult to follow in the AM.    Discussed with Dr. Kapoor.    Kaykay Alejandro MD  PGY-5 Urology  Pager [phone]

## 2023-02-23 NOTE — PHARMACY-ADMISSION MEDICATION HISTORY
Admission medication history interview status for this patient is complete. See [facility] admission navigator for allergy information, prior to admission medications and immunization status.     Medication history interview done, indicate source(s): Patient  Medication history resources (including written lists, pill bottles, clinic record):[facility]  Pharmacy: [facility]    Changes made to PTA medication list:  Added: ibuprofen, jardiance, duloxetine, metformin  Changed: None  Reported as Not Taking: None  Removed: trazodone, venlafaxine    Actions taken by pharmacist (provider contacted, etc):None     Additional medication history information:None    Medication reconciliation/reorder completed by provider prior to medication history?  N   (Y/N)     Medication Affordability:  Not including over the counter (OTC) medications, was there a time in the past 12 months when you did not take your medications as prescribed because of cost?: No     Prior to Admission medications    Medication Sig Last Dose Taking? Auth Provider Long Term End Date   DULoxetine (CYMBALTA) 60 MG capsule Take 60 mg by mouth daily 2/22/2023 Yes Unknown, Entered By History Yes    empagliflozin (JARDIANCE) 10 MG TABS tablet Take 10 mg by mouth daily Past Week Yes Unknown, Entered By History     ibuprofen (ADVIL/MOTRIN) 200 MG tablet Take 600 mg by mouth as needed for pain  Yes Unknown, Entered By History     metFORMIN (GLUCOPHAGE XR) 500 MG 24 hr tablet Take 1,000 mg by mouth daily (with dinner) Past Week Yes Unknown, Entered By History Yes    Pregabalin (LYRICA PO) Take 100 mg by mouth 2 times daily  Past Week Yes Reported, Patient Yes

## 2023-02-23 NOTE — ED PROVIDER NOTES
History     Chief Complaint:  Nausea, Vomiting, Body Aches & Headache       HPI   Britt Holder is a 57 year old female with a history of migraines, hyperglycemia, fibromyalgia, and depression who presents with nausea, vomiting, body aches, and abdominal pain.  Patient states that since Tuesday, she has had headache, nausea, vertigo, and low back/body aches.  Patient states that she has not been able to keep down any fluids.  Patient endorses associated abdominal pain.  Patient denies any dysuria/hematuria.  Patient denies any diarrhea.  Patient denies any cough, but endorses phlegm production.  Patient endorses transient history of shortness of breath a few days ago, but this denies any shortness of breath at this time.  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      Independent Historian:   None - Patient Only    Review of External Notes: 9/5/22  note     ROS:  Review of Systems   Constitutional: Positive for chills. Negative for fever.   HENT: Positive for rhinorrhea. Negative for congestion.    Eyes: Negative for pain and visual disturbance.   Respiratory: Negative for cough, chest tightness and shortness of breath.    Cardiovascular: Negative for chest pain and palpitations.   Gastrointestinal: Positive for abdominal pain, nausea and vomiting. Negative for diarrhea.   Genitourinary: Negative for dysuria and hematuria.   Musculoskeletal: Positive for back pain and myalgias. Negative for neck pain and neck stiffness.   Skin: Negative for color change and pallor.   Neurological: Positive for dizziness and headaches.   Psychiatric/Behavioral: Negative for behavioral problems.   All other systems reviewed and are negative.    Allergies:  Blood-Group Specific Substance- Warm Autoantibody   Compazine  Cortisone  Dicyclomine  Gabapentin  Imitrex [Sumatriptan]  Sulfa Drugs     Medications:    Lyrica  Desyrel   Effexor  Cymbalta  Jardiance  Glucophage     Past Medical History:     Fibromyalgia  IBS  Migraines   Sepsis   Type II diabetes mellitus   Recurrent UTI  Dysthymic disorder  Pyelonephritis due to E. Coli  Allergic rhinitis   Necrobiosis  Kira's pupil, left eye  BPPV    Past Surgical History:    Tonsillectomy & adenoidectomy   Appendectomy  Hysterectomy   LEEP procedure  Cervical conization    Uterine ablation    Social History:  Arrives via EMS  PCP: Yael Liu MD, Internal Medicine     Physical Exam     Patient Vitals for the past 24 hrs:   BP Temp Temp src Pulse Resp SpO2   02/23/23 1257 -- 99.9  F (37.7  C) Oral -- -- --   02/23/23 1036 -- 98.6  F (37  C) Oral -- -- --   02/23/23 0934 115/73 -- -- 111 20 95 %        Physical Exam  Constitutional:       General: Not in acute distress.        Appearance: Normal appearance.   HENT:      Head: Normocephalic and atraumatic.   Eyes:      Extraocular Movements: Extraocular movements intact.      Conjunctiva/sclera: Conjunctivae normal.      Pupils: L>R pupil. Right pupil reactive to light. Left minimally reactive. This is reported to be at baseline per patient.  Cardiovascular:      Rate and Rhythm: Tachycardic rate and regular rhythm.   Pulmonary:      Effort: Pulmonary effort is normal. No respiratory distress.      Breath sounds: Normal breath sounds.   Abdominal:      General: Abdomen is flat. There is no distension.      Palpations: Abdomen is soft.      Tenderness: There is mild left sided abdominal tenderness to palpation.   Musculoskeletal:      Cervical back: Normal range of motion. No rigidity. No midline C-spine tenderness to palpation.     Back: No midline T/L-spine tenderness to palpation.  No off steps.     Right lower leg: No edema.      Left lower leg: No edema.   Skin:     General: Skin is warm and dry.   Neurological:      General: No focal deficit present.      Mental Status: Alert and oriented to person, place, and time.      NIHSS: Patient alert and keenly responsive. Able to answer month and age. Able and blink  eyes and squeeze hands. No gaze palsy. No visual field deficits. No facial palsy. No arm drift bilaterally. No leg drift bilaterally. No limb ataxia. Able to complete finger nose finger and heel to shin. No sensory deficits. No language deficits/aphasia. No dysarthria. No extinction/inattention.     NIHSS score of 0.  Psychiatric:         Mood and Affect: Mood normal.         Behavior: Behavior normal.    Emergency Department Course   ECG  See ED course    Imaging:  XR Chest 2 Views   Final Result   IMPRESSION: There are no acute infiltrates. The cardiac silhouette is   not enlarged. Pulmonary vasculature is unremarkable.      CATHY SILVA MD            SYSTEM ID:  [id number]      CT Head w/o Contrast   Final Result   IMPRESSION: Diffuse cerebral volume loss and cerebral white matter   changes consistent with chronic small vessel ischemic disease. No   evidence for acute intracranial pathology.             Radiation dose for this scan was reduced using automated exposure   control, adjustment of the mA and/or kV according to patient size, or   iterative reconstruction technique      MARZENA HOLDEN MD            SYSTEM ID:  [id number]      CT Abdomen Pelvis w Contrast   Final Result   IMPRESSION:    1.  Right-sided perinephric stranding, mild hydronephrosis, and   urothelial enhancement concerning for pyelonephritis, clinical   correlation needed. Alternatively, this appearance could be related to   recently passed stone.      CATHY SILVA MD            SYSTEM ID:  [id number]         Report per radiology    Laboratory:  Labs Ordered and Resulted from Time of ED Arrival to Time of ED Departure   COMPREHENSIVE METABOLIC PANEL - Abnormal       Result Value    Sodium 138      Potassium 3.6      Chloride 99      Carbon Dioxide (CO2) 24      Anion Gap 15      Urea Nitrogen 17.9      Creatinine 1.09 (*)     Calcium 9.0      Glucose 290 (*)     Alkaline Phosphatase 110 (*)     AST 20      ALT 15      Protein Total 7.0       Albumin 3.7      Bilirubin Total 0.7      GFR Estimate 60 (*)    LIPASE - Abnormal    Lipase 11 (*)    MAGNESIUM - Abnormal    Magnesium 1.4 (*)    ROUTINE UA WITH MICROSCOPIC REFLEX TO CULTURE - Abnormal    Color Urine Yellow      Appearance Urine Clear      Glucose Urine >=1000 (*)     Bilirubin Urine Negative      Ketones Urine 10 (*)     Specific Gravity Urine 1.032      Blood Urine Trace (*)     pH Urine 5.0      Protein Albumin Urine Negative      Urobilinogen Urine Normal      Nitrite Urine Negative      Leukocyte Esterase Urine Small (*)     Bacteria Urine Few (*)     Mucus Urine Present (*)     RBC Urine 2      WBC Urine 33 (*)     Squamous Epithelials Urine 1     GLUCOSE BY METER - Abnormal    GLUCOSE BY METER POCT 266 (*)    CBC WITH PLATELETS AND DIFFERENTIAL - Abnormal    WBC Count 35.1 (*)     RBC Count 4.67      Hemoglobin 13.7      Hematocrit 41.2      MCV 88      MCH 29.3      MCHC 33.3      RDW 13.5      Platelet Count 229      % Neutrophils 90      % Lymphocytes 5      % Monocytes 3      % Eosinophils 0      % Basophils 0      % Immature Granulocytes 2      NRBCs per 100 WBC 0      Absolute Neutrophils 31.6 (*)     Absolute Lymphocytes 1.6      Absolute Monocytes 0.9      Absolute Eosinophils 0.0      Absolute Basophils 0.1      Absolute Immature Granulocytes 0.8 (*)     Absolute NRBCs 0.0     LACTIC ACID WHOLE BLOOD - Abnormal    Lactic Acid 2.2 (*)    BLOOD GAS VENOUS WITH OXYHEMOGLOBIN - Abnormal    pH Venous 7.45 (*)     pCO2 Venous 37 (*)     pO2 Venous 43      Bicarbonate Venous 26      FIO2 0      Oxyhemoglobin Venous 79 (*)     Base Excess/Deficit (+/-) 2.1 (*)    INFLUENZA A/B & SARS-COV2 PCR MULTIPLEX - Normal    Influenza A PCR Negative      Influenza B PCR Negative      RSV PCR Negative      SARS CoV2 PCR Negative     LACTIC ACID WHOLE BLOOD - Normal    Lactic Acid 1.9     GLUCOSE MONITOR NURSING POCT   BLOOD CULTURE   BLOOD CULTURE   URINE CULTURE        Emergency Department Course &  Assessments:       Interventions:  Medications   lactated ringers BOLUS 1,000 mL (0 mLs Intravenous Stopped 2/23/23 1130)   metoclopramide (REGLAN) injection 10 mg (10 mg Intravenous $Given 2/23/23 1054)   diphenhydrAMINE (BENADRYL) injection 25 mg (25 mg Intravenous $Given 2/23/23 1053)   cefTRIAXone (ROCEPHIN) 1 g vial to attach to  mL bag for ADULTS or NS 50 mL bag for PEDS (0 g Intravenous Stopped 2/23/23 1230)   magnesium sulfate 2 g in 50 mL sterile water intermittent infusion (0 g Intravenous Stopped 2/23/23 1201)   CT SCAN FLUSH (63 mLs Intravenous $Given 2/23/23 1112)   iopamidol (ISOVUE-370) solution 500 mL (91 mLs Intravenous $Given 2/23/23 1112)   morphine (PF) injection 4 mg (4 mg Intravenous $Given 2/23/23 1214)   acetaminophen (TYLENOL) tablet 975 mg (975 mg Oral $Given 2/23/23 1309)        Independent Interpretation (X-rays, CTs, rhythm strip):  See ED course    Assessments/Consultations/Discussion of Management or Tests:  ED Course as of 02/23/23 1324   Thu Feb 23, 2023   1008 GLUCOSE BY METER POCT(!): 266   1028 WBC(!): 35.1   1028 Absolute Neutrophils(!): 31.6   1051 Lactic Acid(!): 2.2   1051 WBC Urine(!): 33   1138 CT Head w/o Contrast  Diffuse cerebral volume loss and cerebral white matter  changes consistent with chronic small vessel ischemic disease. No  evidence for acute intracranial pathology.    1153 CT Abdomen Pelvis w Contrast  Right-sided perinephric stranding, mild hydronephrosis, and  urothelial enhancement concerning for pyelonephritis, clinical  correlation needed. Alternatively, this appearance could be related to  recently passed stone.   1159 Patient updated on pyelonephritis and plan for admission. Patient requesting pain meds for low back pain - likely 2.2 to pyelonephritis. IV morphine ordered.   1226 Discussed patient with Goldie Torres who will admit patient under Dr. Spangler.     Social Determinants of Health affecting care:   None    Disposition:  The patient was  admitted to the hospital under the care of Dr. Spangler.     Impression & Plan      Medical Decision Makin-year-old female as described above presents to the emergency department for multiple complaints including nausea, vomiting, headache, dizziness, body aches, and abdominal pain.  Patient hemodynamically stable at time evaluation.  Afebrile.  Mildly tachycardic.  Patient has history of migraine, but states that headache is slightly different from usual headache.  Patient however denies this being a thunderclap headache or worst headache ever in endorses this being a gradual in onset.  Given unequal pupil size, difference in headache quality, with associated nausea and vomiting, will order CT head without contrast for evaluation for mass or bleed.  Patient has no meningeal symptoms or fever to suggest meningitis.  Will CT abdomen pelvis with contrast for evaluation for acute intra-abdominal pathology including, but not limited to, bowel obstructions, diverticulitis, pancreatitis, cholecystitis, and renal stones.  Chest x-ray for evaluation of pneumonia given flulike illness.  Viral swab.  Trial of Reglan Benadryl for potential migraine headache.  IV fluid rehydration.  If patient has persistence of vertiginous symptoms, consider MR imaging.  Discussed care plan with patient who voiced understanding and agreement with plan.  Answered all questions.  Additional work-up and orders as listed in chart.    Please refer to ED course above for details on the patient's treatment course and any changes or updates in care plan beyond my initial evaluation and MDM.    Diagnosis:    ICD-10-CM    1. Sepsis, due to unspecified organism, unspecified whether acute organ dysfunction present (H)  A41.9       2. Pyelonephritis, acute  N10       3. Urinary tract infection without hematuria, site unspecified  N39.0            Scribe Disclosure:  Mitra ROYAL, am serving as a scribe at 10:10 AM on 2023 to document  services personally performed by Nicanor Montero DO based on my observations and the provider's statements to me.     2/23/2023   Nicanor Montero DO Yeh, Ferris, DO  02/23/23 1324       Nicanor Montero DO  02/23/23 1324

## 2023-02-23 NOTE — PLAN OF CARE
/64 (BP Location: Right arm)   Pulse 108   Temp 97.2  F (36.2  C) (Temporal)   Resp 18   SpO2 96%     Neuro: a/o x4  Cardiac: tachy  Lungs: WNL  GI: WNL  : bladder scan post void- if >200, straight cath  Pain: 3/10  IV: NS @100, saline locked  Meds: rocephin  Labs/tests: K 3.6, Mg 1.4  Diet: reg- NPO @midnight ex: meds and ice  Activity: SBA  Misc: BG with meals and bed. Mg and K protocol. Urology following.   Plan: Will continue to monitor and provide cares.

## 2023-02-24 LAB
ACINETOBACTER SPECIES: NOT DETECTED
ANION GAP SERPL CALCULATED.3IONS-SCNC: 13 MMOL/L (ref 7–15)
BASOPHILS # BLD AUTO: 0.1 10E3/UL (ref 0–0.2)
BASOPHILS NFR BLD AUTO: 0 %
BUN SERPL-MCNC: 25.2 MG/DL (ref 6–20)
CALCIUM SERPL-MCNC: 8.4 MG/DL (ref 8.6–10)
CHLORIDE SERPL-SCNC: 98 MMOL/L (ref 98–107)
CITROBACTER SPECIES: NOT DETECTED
CREAT SERPL-MCNC: 1.29 MG/DL (ref 0.51–0.95)
CTX-M: NOT DETECTED
DEPRECATED HCO3 PLAS-SCNC: 23 MMOL/L (ref 22–29)
ENTEROBACTER SPECIES: NOT DETECTED
EOSINOPHIL # BLD AUTO: 0 10E3/UL (ref 0–0.7)
EOSINOPHIL NFR BLD AUTO: 0 %
ERYTHROCYTE [DISTWIDTH] IN BLOOD BY AUTOMATED COUNT: 13.3 % (ref 10–15)
ESCHERICHIA COLI: DETECTED
GFR SERPL CREATININE-BSD FRML MDRD: 49 ML/MIN/1.73M2
GLUCOSE BLDC GLUCOMTR-MCNC: 174 MG/DL (ref 70–99)
GLUCOSE BLDC GLUCOMTR-MCNC: 175 MG/DL (ref 70–99)
GLUCOSE BLDC GLUCOMTR-MCNC: 189 MG/DL (ref 70–99)
GLUCOSE BLDC GLUCOMTR-MCNC: 215 MG/DL (ref 70–99)
GLUCOSE SERPL-MCNC: 208 MG/DL (ref 70–99)
HBA1C MFR BLD: 8.8 %
HCT VFR BLD AUTO: 35.2 % (ref 35–47)
HGB BLD-MCNC: 11.6 G/DL (ref 11.7–15.7)
IMM GRANULOCYTES # BLD: 0.7 10E3/UL
IMM GRANULOCYTES NFR BLD: 3 %
IMP: NOT DETECTED
KLEBSIELLA OXYTOCA: NOT DETECTED
KLEBSIELLA PNEUMONIAE: NOT DETECTED
KPC: NOT DETECTED
LYMPHOCYTES # BLD AUTO: 1.3 10E3/UL (ref 0.8–5.3)
LYMPHOCYTES NFR BLD AUTO: 5 %
MAGNESIUM SERPL-MCNC: 2 MG/DL (ref 1.7–2.3)
MAGNESIUM SERPL-MCNC: 2.1 MG/DL (ref 1.7–2.3)
MCH RBC QN AUTO: 29.2 PG (ref 26.5–33)
MCHC RBC AUTO-ENTMCNC: 33 G/DL (ref 31.5–36.5)
MCV RBC AUTO: 89 FL (ref 78–100)
MONOCYTES # BLD AUTO: 0.8 10E3/UL (ref 0–1.3)
MONOCYTES NFR BLD AUTO: 3 %
NDM: NOT DETECTED
NEUTROPHILS # BLD AUTO: 20.9 10E3/UL (ref 1.6–8.3)
NEUTROPHILS NFR BLD AUTO: 89 %
NRBC # BLD AUTO: 0 10E3/UL
NRBC BLD AUTO-RTO: 0 /100
OXA (DETECTED/NOT DETECTED): NOT DETECTED
PHOSPHATE SERPL-MCNC: 2 MG/DL (ref 2.5–4.5)
PLATELET # BLD AUTO: 174 10E3/UL (ref 150–450)
POTASSIUM SERPL-SCNC: 3.4 MMOL/L (ref 3.4–5.3)
POTASSIUM SERPL-SCNC: 3.6 MMOL/L (ref 3.4–5.3)
POTASSIUM SERPL-SCNC: 4.5 MMOL/L (ref 3.4–5.3)
PROTEUS SPECIES: NOT DETECTED
PSEUDOMONAS AERUGINOSA: NOT DETECTED
RBC # BLD AUTO: 3.97 10E6/UL (ref 3.8–5.2)
SODIUM SERPL-SCNC: 134 MMOL/L (ref 136–145)
VIM: NOT DETECTED
WBC # BLD AUTO: 23.8 10E3/UL (ref 4–11)

## 2023-02-24 PROCEDURE — 250N000011 HC RX IP 250 OP 636: Performed by: PHYSICIAN ASSISTANT

## 2023-02-24 PROCEDURE — 258N000003 HC RX IP 258 OP 636: Performed by: INTERNAL MEDICINE

## 2023-02-24 PROCEDURE — 85025 COMPLETE CBC W/AUTO DIFF WBC: CPT | Performed by: PHYSICIAN ASSISTANT

## 2023-02-24 PROCEDURE — 36415 COLL VENOUS BLD VENIPUNCTURE: CPT | Performed by: INTERNAL MEDICINE

## 2023-02-24 PROCEDURE — 83735 ASSAY OF MAGNESIUM: CPT | Performed by: INTERNAL MEDICINE

## 2023-02-24 PROCEDURE — 250N000013 HC RX MED GY IP 250 OP 250 PS 637: Performed by: PHYSICIAN ASSISTANT

## 2023-02-24 PROCEDURE — 250N000013 HC RX MED GY IP 250 OP 250 PS 637: Performed by: INTERNAL MEDICINE

## 2023-02-24 PROCEDURE — 84132 ASSAY OF SERUM POTASSIUM: CPT | Performed by: INTERNAL MEDICINE

## 2023-02-24 PROCEDURE — 84100 ASSAY OF PHOSPHORUS: CPT | Performed by: INTERNAL MEDICINE

## 2023-02-24 PROCEDURE — 82310 ASSAY OF CALCIUM: CPT | Performed by: PHYSICIAN ASSISTANT

## 2023-02-24 PROCEDURE — 250N000011 HC RX IP 250 OP 636: Performed by: INTERNAL MEDICINE

## 2023-02-24 PROCEDURE — 83036 HEMOGLOBIN GLYCOSYLATED A1C: CPT | Performed by: INTERNAL MEDICINE

## 2023-02-24 PROCEDURE — 120N000001 HC R&B MED SURG/OB

## 2023-02-24 PROCEDURE — 36415 COLL VENOUS BLD VENIPUNCTURE: CPT | Performed by: PHYSICIAN ASSISTANT

## 2023-02-24 PROCEDURE — 99221 1ST HOSP IP/OBS SF/LOW 40: CPT | Mod: FS | Performed by: STUDENT IN AN ORGANIZED HEALTH CARE EDUCATION/TRAINING PROGRAM

## 2023-02-24 PROCEDURE — 83735 ASSAY OF MAGNESIUM: CPT | Performed by: PHYSICIAN ASSISTANT

## 2023-02-24 PROCEDURE — 99233 SBSQ HOSP IP/OBS HIGH 50: CPT | Performed by: INTERNAL MEDICINE

## 2023-02-24 RX ORDER — CEFTRIAXONE 2 G/1
2 INJECTION, POWDER, FOR SOLUTION INTRAMUSCULAR; INTRAVENOUS EVERY 24 HOURS
Status: DISCONTINUED | OUTPATIENT
Start: 2023-02-24 | End: 2023-02-27 | Stop reason: HOSPADM

## 2023-02-24 RX ORDER — MULTIPLE VITAMINS W/ MINERALS TAB 9MG-400MCG
1 TAB ORAL DAILY
Status: DISCONTINUED | OUTPATIENT
Start: 2023-02-25 | End: 2023-02-27 | Stop reason: HOSPADM

## 2023-02-24 RX ORDER — LORAZEPAM 2 MG/ML
1-2 INJECTION INTRAMUSCULAR EVERY 30 MIN PRN
Status: DISCONTINUED | OUTPATIENT
Start: 2023-02-24 | End: 2023-02-27 | Stop reason: HOSPADM

## 2023-02-24 RX ORDER — ESTRADIOL 0.1 MG/G
2 CREAM VAGINAL
Status: DISCONTINUED | OUTPATIENT
Start: 2023-02-27 | End: 2023-02-27 | Stop reason: HOSPADM

## 2023-02-24 RX ORDER — POTASSIUM CHLORIDE 1500 MG/1
40 TABLET, EXTENDED RELEASE ORAL ONCE
Status: COMPLETED | OUTPATIENT
Start: 2023-02-24 | End: 2023-02-24

## 2023-02-24 RX ORDER — LORAZEPAM 1 MG/1
1-2 TABLET ORAL EVERY 30 MIN PRN
Status: DISCONTINUED | OUTPATIENT
Start: 2023-02-24 | End: 2023-02-27 | Stop reason: HOSPADM

## 2023-02-24 RX ORDER — FOLIC ACID 1 MG/1
1 TABLET ORAL DAILY
Status: DISCONTINUED | OUTPATIENT
Start: 2023-02-25 | End: 2023-02-27 | Stop reason: HOSPADM

## 2023-02-24 RX ORDER — FLUMAZENIL 0.1 MG/ML
0.2 INJECTION, SOLUTION INTRAVENOUS
Status: DISCONTINUED | OUTPATIENT
Start: 2023-02-24 | End: 2023-02-27 | Stop reason: HOSPADM

## 2023-02-24 RX ORDER — SODIUM CHLORIDE, SODIUM LACTATE, POTASSIUM CHLORIDE, CALCIUM CHLORIDE 600; 310; 30; 20 MG/100ML; MG/100ML; MG/100ML; MG/100ML
INJECTION, SOLUTION INTRAVENOUS CONTINUOUS
Status: DISCONTINUED | OUTPATIENT
Start: 2023-02-24 | End: 2023-02-25

## 2023-02-24 RX ADMIN — DULOXETINE HYDROCHLORIDE 60 MG: 60 CAPSULE, DELAYED RELEASE ORAL at 09:44

## 2023-02-24 RX ADMIN — HYDROMORPHONE HYDROCHLORIDE 0.2 MG: 0.2 INJECTION, SOLUTION INTRAMUSCULAR; INTRAVENOUS; SUBCUTANEOUS at 09:45

## 2023-02-24 RX ADMIN — SODIUM CHLORIDE, POTASSIUM CHLORIDE, SODIUM LACTATE AND CALCIUM CHLORIDE: 600; 310; 30; 20 INJECTION, SOLUTION INTRAVENOUS at 12:47

## 2023-02-24 RX ADMIN — EMPAGLIFLOZIN 10 MG: 10 TABLET, FILM COATED ORAL at 09:44

## 2023-02-24 RX ADMIN — POTASSIUM CHLORIDE 40 MEQ: 1500 TABLET, EXTENDED RELEASE ORAL at 12:09

## 2023-02-24 RX ADMIN — ONDANSETRON 4 MG: 4 TABLET, ORALLY DISINTEGRATING ORAL at 21:16

## 2023-02-24 RX ADMIN — LORAZEPAM 0.5 MG: 2 INJECTION INTRAMUSCULAR; INTRAVENOUS at 21:48

## 2023-02-24 RX ADMIN — ACETAMINOPHEN 650 MG: 325 TABLET ORAL at 21:16

## 2023-02-24 RX ADMIN — HYDROMORPHONE HYDROCHLORIDE 0.2 MG: 0.2 INJECTION, SOLUTION INTRAMUSCULAR; INTRAVENOUS; SUBCUTANEOUS at 17:02

## 2023-02-24 RX ADMIN — HYDROMORPHONE HYDROCHLORIDE 0.2 MG: 0.2 INJECTION, SOLUTION INTRAMUSCULAR; INTRAVENOUS; SUBCUTANEOUS at 12:46

## 2023-02-24 RX ADMIN — HYDROMORPHONE HYDROCHLORIDE 0.2 MG: 0.2 INJECTION, SOLUTION INTRAMUSCULAR; INTRAVENOUS; SUBCUTANEOUS at 06:08

## 2023-02-24 RX ADMIN — PREGABALIN 100 MG: 100 CAPSULE ORAL at 21:16

## 2023-02-24 RX ADMIN — CEFTRIAXONE 2 G: 2 INJECTION, POWDER, FOR SOLUTION INTRAMUSCULAR; INTRAVENOUS at 09:45

## 2023-02-24 RX ADMIN — PREGABALIN 100 MG: 100 CAPSULE ORAL at 09:44

## 2023-02-24 ASSESSMENT — ACTIVITIES OF DAILY LIVING (ADL)
ADLS_ACUITY_SCORE: 22

## 2023-02-24 NOTE — PROGRESS NOTES
"[facility]    Medicine Progress Note - Hospitalist Service    Date of Admission:  2/23/2023    Assessment & Plan      Britt Holder is a 57 year old female with PMHx significant for DM type II, fibromyalgia, IBS and hx of migraines, who was admitted on 2/23/2023 with pyelonephritis.     1. Pyelonephritis   Sepsis due to UTI with gram-negative bacteremia    -Continue antibiotics currently on 2 g of ceftriaxone intravenously daily  -Appreciate prompt input from urology service.  Close monitoring.  No plans for any operative intervention or approach for now  -Advance diet  -Leukocytosis improving  -Follow cultures and sensitivity panel, likely an E. Coli  -As needed APAP  -Supportive care    2. NICOLAS secondary to underlying sepsis and pyelonephritis/UTI  -On IV fluid    4. DM type II non-insulin-requiring  -Check A1c  -Metformin held due to NICOLAS and lactic acidosis  -May be able to resume in the next coming days as she also received contrast study during admission process  -Jardiance discontinued.  Unfortunately this medication also has side effects of increasing chance of sepsis related to UTI and failure nephritis    5. Fibromyalgia  Resume home Cymbalta and Lyrica    6. Hypomagnesemia   1.4, likely due to vomiting.   - replace per protocol       Diet: Moderate Consistent Carb (60 g CHO per Meal) Diet    DVT Prophylaxis: Pneumatic Compression Devices and Ambulate every shift  Baldwin Catheter: Not present  Lines: None     Cardiac Monitoring: None  Code Status: Full Code      Clinically Significant Risk Factors Present on Admission          # Hypocalcemia: Lowest Ca = 8.4 mg/dL in last 2 days, will monitor and replace as appropriate   # Hypomagnesemia: Lowest Mg = 1.4 mg/dL in last 2 days, will replace as needed            # Overweight: Estimated body mass index is 28.87 kg/m  as calculated from the following:    Height as of this encounter: 1.651 m (5' 5\").    Weight as of this encounter: 78.7 kg " (173 lb 8 oz).           Disposition Plan     Expected Discharge Date: Anticipating Ms. Matthew will still be needing inpatient care at least in the next 2 more days             Deep Spangler MD, MD  Hospitalist Service  [facility]  Securely message with [employer] (more info)  Text page via [employer] Paging/Directory   ______________________________________________________________________    Interval History   Continuing service care for this very pleasant middle-age lady whom I found this morning laying comfortably in bed sleeping but easily aroused with minimal verbal and physical stimuli.  She mentioned that she is feeling a little better.  Endorses no nausea or vomiting or flank pain.  Still having intermittent headaches but no mental status changes nor accompanying neck pain.  Not requiring any oxygen support.  Afebrile this morning.  No reported diarrhea or any bleeding tendencies.  Voiding freely and denies any    Physical Exam   Vital Signs: Temp: 99.2  F (37.3  C) Temp src: Temporal BP: 125/72 Pulse: 114   Resp: 18 SpO2: 92 % O2 Device: Nasal cannula Oxygen Delivery: 2 LPM  Weight: 173 lbs 8 oz    HEENT; Atraumatic, normocephalic, pinkish conjuctiva, pupils bilateral reactive   Skin: warm and moist, no rashes  Lymphatics: no cervical or axillary lymphandenopathy  Lungs: equal chest expansion, clear to auscultation, no wheezes, no stridor, no crackles,   Heart: normal rate, normal rhythm, no rubs or gallops.   Abdomen: normal bowel sounds, no tenderness, no peritoneal signs, no guarding  Extremities: no deformities, no edema   Neuro; follow commands, alert and oriented x3, spontaneous speech, coherent, moves all extremities spontaneously  Psych; no hallucination, euthymic mood, not agitated        Medical Decision Making       50 MINUTES SPENT BY ME on the date of service doing chart review, history, exam, documentation & further activities per the note.  MANAGEMENT DISCUSSED with the  following over the past 24 hours: yes   NOTE(S)/MEDICAL RECORDS REVIEWED over the past 24 hours: yes  Tests ORDERED & REVIEWED in the past 24 hours:      Data     I have personally reviewed the following data over the past 24 hrs:    23.8 (H)  \   11.6 (L)   / 174     134 (L) 98 25.2 (H) /  174 (H)   3.4 23 1.29 (H) \       Procal: N/A CRP: N/A Lactic Acid: 1.9         Imaging results reviewed over the past 24 hrs:   Recent Results (from the past 24 hour(s))   XR Chest 2 Views    Narrative    CHEST TWO VIEWS 2/23/2023 11:44 AM     HISTORY: Nausea, vomiting, diarrhea, and headache    COMPARISON: None.       Impression    IMPRESSION: There are no acute infiltrates. The cardiac silhouette is  not enlarged. Pulmonary vasculature is unremarkable.    CATHY SILVA MD         SYSTEM ID:  [id number]

## 2023-02-24 NOTE — CONSULTS
[facility] Consultation by [facility]    Britt Holder MRN# [id number]   Age: 57 year old YOB: 1965     Date of Admission:  2/23/2023    Reason for consult: Pyelonephritis, question possible kidney stone       Requesting PA/MD: ANDRIA Torres PA-C       Level of consult: Consult, follow and place orders           Assessment and Plan:   Assessment:   Pyelonephritis  Urosepsis  Headache  NICOLAS  DM      Plan:   -Okay for a diet today.  -Continue with IV antibiotics.  Follow cultures.  Transition to culture specific as available.  -Continue to monitor leukocytosis and creatinine.  Leukocytosis is improving on antibiotics alone at this time.  -Hydronephrosis may be due to recent stone passage or pyelonephritis.  -Would consider stenting if clinical decline or worsening hydronephrosis.  -Pain and nausea management per primary service.  -Discussed that as an outpatient, will need to work on improving overall blood sugar and contribution to infections with poorly controlled diabetes.  Also should avoid or have Jardiance discontinued due to risk of worsening or recurrent infections  -Consider Baldwin catheter for maximum decompression of the urinary system.  Would recommend if continuing to require straight catheterization.  -Will continue to follow along.    Genoveva Logan PA-C   [facility]  [phone]               Chief Complaint:   Pyelonephritis, question possible kidney stone    History is obtained from the patient and EMR.         History of Present Illness:   This patient is a 57 year old female who presented to the ER yesterday with nausea, vomiting, abdominal pain, and headache.  Medical history significant for migraines, diabetes mellitus type 2, fibromyalgia, and depression.  Her symptoms started on Tuesday.    Patient denies any hematuria or dysuria.  Urinalysis was not super convincing for infection and does show evidence of significant glucosuria-likely due to Jardiance that is  listed.  CT imaging was obtained and shows mild right hydronephrosis as well as findings consistent with likely pyelonephritis.  Blood culture positive for E. coli on day 1.  Patient is on IV Rocephin.    She does note that she occasionally gets urinary tract infections.  She denies any personal history of nephrolithiasis.  She does endorse a history of sepsis and pyelonephritis.  She is currently NPO.    Most recent hemoglobin A1c 10.6%.  Patient continues to endorse headache.  She also continues to endorse overall feeling poorly.  She currently denies nausea, vomiting, fevers, chills, shortness of breath, or chest pain.     Creatinine 1.29 EGFR 49 (1.09 EGFR 60).  Baseline creatinine approximately 0.7.  Leukocytosis slowly improving, WBC 23.8 (35.1).  Hemoglobin 11.6.  Some mild tachycardia.  Tmax of 99.9.    Patient generally feels like she empties her bladder, but did require straight catheterization 1 time last night.         Past Medical History:     Past Medical History:   Diagnosis Date     Fibromyalgia      Hyperglycemia      Irritable bowel syndrome      Migraines              Past Surgical History:     Past Surgical History:   Procedure Laterality Date     addenoidectomy       APPENDECTOMY       GYN SURGERY      hYSTERECTOMY     HYSTERECTOMY       TONSILLECTOMY               Social History:     Social History     Tobacco Use     Smoking status: Every Day     Packs/day: 0.25     Types: Cigarettes     Smokeless tobacco: Not on file   Substance Use Topics     Alcohol use: Yes     Comment: rare             Family History:   No family history on file.  Family history reviewed.             Allergies:     Allergies   Allergen Reactions     Blood-Group Specific Substance      Patient has a Warm Auto Antibody. Blood products may be delayed. Draw 2 purple and 1 red tube for each Type and Screen and/or Type and Crossmatch order.     Compazine      Cortisone Hives     Including injectable     Cortizone       "Dicyclomine      Gabapentin      Imitrex [Sumatriptan] Swelling     Sulfa Drugs Rash             Medications:     Current Facility-Administered Medications   Medication     acetaminophen (TYLENOL) tablet 650 mg    Or     acetaminophen (TYLENOL) Suppository 650 mg     cefTRIAXone (ROCEPHIN) 2 g vial to attach to  ml bag for ADULTS or NS 50 ml bag for PEDS     glucose gel 15-30 g    Or     dextrose 50 % injection 25-50 mL    Or     glucagon injection 1 mg     DULoxetine (CYMBALTA) DR capsule 60 mg     empagliflozin (JARDIANCE) tablet 10 mg     HYDROmorphone (DILAUDID) injection 0.2 mg     insulin aspart (NovoLOG) injection (RAPID ACTING)     insulin aspart (NovoLOG) injection (RAPID ACTING)     lidocaine (LMX4) cream     lidocaine 1 % 0.1-1 mL     LORazepam (ATIVAN) injection 0.5-1 mg     melatonin tablet 1 mg     ondansetron (ZOFRAN ODT) ODT tab 4 mg    Or     ondansetron (ZOFRAN) injection 4 mg     oxyCODONE (ROXICODONE) tablet 5 mg     polyethylene glycol (MIRALAX) Packet 17 g     potassium chloride ER (KLOR-CON M) CR tablet 40 mEq     pregabalin (LYRICA) capsule 100 mg     senna-docusate (SENOKOT-S/PERICOLACE) 8.6-50 MG per tablet 1 tablet    Or     senna-docusate (SENOKOT-S/PERICOLACE) 8.6-50 MG per tablet 2 tablet     sodium chloride (PF) 0.9% PF flush 3 mL     sodium chloride (PF) 0.9% PF flush 3 mL     sodium chloride 0.9% infusion             Review of Systems:   A comprehensive 10-point review of systems was performed and found to be negative except as described in the HPI.     /72 (BP Location: Right arm)   Pulse 114   Temp 99.2  F (37.3  C) (Temporal)   Resp 18   Ht 1.651 m (5' 5\")   Wt 78.7 kg (173 lb 8 oz)   SpO2 92%   BMI 28.87 kg/m    PSYCH: NAD, tearful  EYES: EOMI  MOUTH: MMM  NECK: Supple, no notable adenopathy  RESP: Unlabored breathing  CARDIAC: No LE edema, tachycardia by radial pulse  SKIN: Warm, no rashes  ABD: soft, Nontender  NEURO: AAO x3  URO: Urinating on won.         " Data:     Lab Results   Component Value Date    WBC 23.8 (H) 02/24/2023    HGB 11.6 (L) 02/24/2023    HCT 35.2 02/24/2023    MCV 89 02/24/2023     02/24/2023     Lab Results   Component Value Date    CR 1.29 (H) 02/24/2023    CR 1.09 (H) 02/23/2023     Recent Labs   Lab 02/23/23  1021   COLOR Yellow   APPEARANCE Clear   URINEGLC >=1000*   URINEBILI Negative   URINEKETONE 10*   SG 1.032   URINEPH 5.0   PROTEIN Negative   NITRITE Negative   LEUKEST Small*   RBCU 2   WBCU 33*     Urine culture: in process.  Blood cultures: 1/2 positive for E. Coli.    Blood cultures:   XR Chest 2 Views    Result Date: 2/23/2023  CHEST TWO VIEWS 2/23/2023 11:44 AM HISTORY: Nausea, vomiting, diarrhea, and headache COMPARISON: None.     IMPRESSION: There are no acute infiltrates. The cardiac silhouette is not enlarged. Pulmonary vasculature is unremarkable. CATHY SILVA MD   SYSTEM ID:  [id number]    CT Abdomen Pelvis w Contrast    Result Date: 2/23/2023  CT ABDOMEN AND PELVIS WITH CONTRAST 2/23/2023 11:27 AM CLINICAL HISTORY: Abdominal pain. TECHNIQUE: CT scan of the abdomen and pelvis was performed following injection of IV contrast. Multiplanar reformats were obtained. Dose reduction techniques were used. CONTRAST: 91mL Isovue-370 COMPARISON: December 8, 2016 FINDINGS: LOWER CHEST: Minimal dependent probable atelectasis on the right, trace pleural fluid on the right. Left lung base clear. HEPATOBILIARY: Normal contour with no significant mass. No bile duct dilatation. Calcified gallstones. Moderate hepatic steatosis. PANCREAS: No significant mass, duct dilatation, or inflammatory change. SPLEEN: Normal size. ADRENAL GLANDS: No significant nodules. KIDNEYS/BLADDER: Perinephric stranding and urothelial enhancement on the right with areas of decreased enhancement in the renal parenchyma concerning for pyelonephritis. Left kidney unremarkable. No urolithiasis demonstrated. BOWEL: No obstruction or inflammatory change. PELVIC ORGANS:  No pelvic masses. ADDITIONAL FINDINGS: No free air. No free fluid. A few mildly prominent retroperitoneal lymph nodes are noted, but these are similar to previous and may be reactive in this setting. MUSCULOSKELETAL: No frankly destructive bony lesions.     IMPRESSION: 1.  Right-sided perinephric stranding, mild hydronephrosis, and urothelial enhancement concerning for pyelonephritis, clinical correlation needed. Alternatively, this appearance could be related to recently passed stone. CATHY SILVA MD   SYSTEM ID:  [id number]    CT Head w/o Contrast    Result Date: 2/23/2023  CT OF THE HEAD WITHOUT CONTRAST 2/23/2023 11:28 AM COMPARISON: None HISTORY: Headache. Acute headache (< three months), no complicating features. TECHNIQUE: 5 mm thick axial CT images of the head were acquired without IV contrast material. FINDINGS: There is mild diffuse cerebral volume loss. There are subtle patchy areas of decreased density in the cerebral white matter bilaterally that are consistent with sequela of chronic small vessel ischemic disease. The ventricles and basal cisterns are within normal limits in configuration given the degree of cerebral volume loss. There is no midline shift. There are no extra-axial fluid collections. No intracranial hemorrhage, mass or recent infarct. The visualized paranasal sinuses are well-aerated. There is no mastoiditis. There are no fractures of the visualized bones.     IMPRESSION: Diffuse cerebral volume loss and cerebral white matter changes consistent with chronic small vessel ischemic disease. No evidence for acute intracranial pathology. Radiation dose for this scan was reduced using automated exposure control, adjustment of the mA and/or kV according to patient size, or iterative reconstruction technique MARZENA HOLDEN MD   SYSTEM ID:  [id number]

## 2023-02-24 NOTE — PROGRESS NOTES
Cross cover notified of patient with positive blood culture for GNR's, verigene pending.  Here with pyelonephritis seen on CT.  Currently on 1 g IV ceftriaxone every 24 hours.  -Increase IV ceftriaxone to 2 g given pyelonephritis and bacteremia

## 2023-02-24 NOTE — PROGRESS NOTES
Case discussed with me by OC  Stable hemodynamics  On IV abx  Urology input.  No immediate urology operative plans tonight  Monitor infection markers, kidney function  Follow cultures  Keep her NPO after MN in case she needs urology procedure in AM    Crys

## 2023-02-24 NOTE — PLAN OF CARE
Pt is alert and oriented x4, steady, transfers independently in room, refused bed alarm. Reported back pain, and bad headache, prn Dilaudid IV given per pt request because 'Oxycodone never helps'. From 7-11pm pt voided 300, bladder scan amt was 373, str cathed for 500 ml. Between 11 pm and 4 am pt voided 400 ml, bladder scanned for 35 ml. NS running at 100 ml/hr. Pt is NPO since mid night. Pt is on 2L of O2 to keep O2 above 90, pt is 88 on RA. Plan for urology consult.

## 2023-02-24 NOTE — PROVIDER NOTIFICATION
DATE:  2/24/2023   TIME OF RECEIPT FROM LAB:  1:45 am  LAB TEST:  Blood culture L arm drawn on 10:55 am  LAB VALUE:  Gram negative bacilli  RESULTS GIVEN WITH READ-BACK TO (PROVIDER):  hospitalist  TIME LAB VALUE REPORTED TO PROVIDER:   1:49 am

## 2023-02-25 LAB
ANION GAP SERPL CALCULATED.3IONS-SCNC: 15 MMOL/L (ref 7–15)
BACTERIA UR CULT: ABNORMAL
BASE EXCESS BLDV CALC-SCNC: -1.5 MMOL/L (ref -7.7–1.9)
BASOPHILS # BLD AUTO: 0.1 10E3/UL (ref 0–0.2)
BASOPHILS NFR BLD AUTO: 0 %
BUN SERPL-MCNC: 24.8 MG/DL (ref 6–20)
CALCIUM SERPL-MCNC: 8.5 MG/DL (ref 8.6–10)
CHLORIDE SERPL-SCNC: 100 MMOL/L (ref 98–107)
CREAT SERPL-MCNC: 0.88 MG/DL (ref 0.51–0.95)
DEPRECATED HCO3 PLAS-SCNC: 20 MMOL/L (ref 22–29)
EOSINOPHIL # BLD AUTO: 0.1 10E3/UL (ref 0–0.7)
EOSINOPHIL NFR BLD AUTO: 1 %
ERYTHROCYTE [DISTWIDTH] IN BLOOD BY AUTOMATED COUNT: 13.5 % (ref 10–15)
GFR SERPL CREATININE-BSD FRML MDRD: 77 ML/MIN/1.73M2
GLUCOSE BLDC GLUCOMTR-MCNC: 125 MG/DL (ref 70–99)
GLUCOSE BLDC GLUCOMTR-MCNC: 128 MG/DL (ref 70–99)
GLUCOSE BLDC GLUCOMTR-MCNC: 164 MG/DL (ref 70–99)
GLUCOSE BLDC GLUCOMTR-MCNC: 173 MG/DL (ref 70–99)
GLUCOSE BLDC GLUCOMTR-MCNC: 190 MG/DL (ref 70–99)
GLUCOSE SERPL-MCNC: 151 MG/DL (ref 70–99)
HCO3 BLDV-SCNC: 24 MMOL/L (ref 21–28)
HCT VFR BLD AUTO: 32.8 % (ref 35–47)
HGB BLD-MCNC: 10.5 G/DL (ref 11.7–15.7)
IMM GRANULOCYTES # BLD: 0.3 10E3/UL
IMM GRANULOCYTES NFR BLD: 2 %
LACTATE SERPL-SCNC: 1.7 MMOL/L (ref 0.7–2)
LYMPHOCYTES # BLD AUTO: 1.5 10E3/UL (ref 0.8–5.3)
LYMPHOCYTES NFR BLD AUTO: 9 %
MAGNESIUM SERPL-MCNC: 2.1 MG/DL (ref 1.7–2.3)
MCH RBC QN AUTO: 29.1 PG (ref 26.5–33)
MCHC RBC AUTO-ENTMCNC: 32 G/DL (ref 31.5–36.5)
MCV RBC AUTO: 91 FL (ref 78–100)
MONOCYTES # BLD AUTO: 0.8 10E3/UL (ref 0–1.3)
MONOCYTES NFR BLD AUTO: 5 %
NEUTROPHILS # BLD AUTO: 13.9 10E3/UL (ref 1.6–8.3)
NEUTROPHILS NFR BLD AUTO: 83 %
NRBC # BLD AUTO: 0 10E3/UL
NRBC BLD AUTO-RTO: 0 /100
O2/TOTAL GAS SETTING VFR VENT: 5 %
PCO2 BLDV: 44 MM HG (ref 40–50)
PH BLDV: 7.35 [PH] (ref 7.32–7.43)
PLATELET # BLD AUTO: 141 10E3/UL (ref 150–450)
PO2 BLDV: 25 MM HG (ref 25–47)
POTASSIUM SERPL-SCNC: 4 MMOL/L (ref 3.4–5.3)
RBC # BLD AUTO: 3.61 10E6/UL (ref 3.8–5.2)
SODIUM SERPL-SCNC: 135 MMOL/L (ref 136–145)
WBC # BLD AUTO: 16.6 10E3/UL (ref 4–11)

## 2023-02-25 PROCEDURE — 83605 ASSAY OF LACTIC ACID: CPT | Performed by: INTERNAL MEDICINE

## 2023-02-25 PROCEDURE — 80048 BASIC METABOLIC PNL TOTAL CA: CPT | Performed by: INTERNAL MEDICINE

## 2023-02-25 PROCEDURE — 99207 PR NO CHARGE LOS: CPT | Performed by: INTERNAL MEDICINE

## 2023-02-25 PROCEDURE — 99233 SBSQ HOSP IP/OBS HIGH 50: CPT | Performed by: INTERNAL MEDICINE

## 2023-02-25 PROCEDURE — 258N000003 HC RX IP 258 OP 636: Performed by: INTERNAL MEDICINE

## 2023-02-25 PROCEDURE — 250N000011 HC RX IP 250 OP 636: Performed by: INTERNAL MEDICINE

## 2023-02-25 PROCEDURE — 250N000013 HC RX MED GY IP 250 OP 250 PS 637: Performed by: PHYSICIAN ASSISTANT

## 2023-02-25 PROCEDURE — 36415 COLL VENOUS BLD VENIPUNCTURE: CPT | Performed by: INTERNAL MEDICINE

## 2023-02-25 PROCEDURE — 250N000011 HC RX IP 250 OP 636: Performed by: PHYSICIAN ASSISTANT

## 2023-02-25 PROCEDURE — 83735 ASSAY OF MAGNESIUM: CPT | Performed by: INTERNAL MEDICINE

## 2023-02-25 PROCEDURE — 85025 COMPLETE CBC W/AUTO DIFF WBC: CPT | Performed by: INTERNAL MEDICINE

## 2023-02-25 PROCEDURE — 87040 BLOOD CULTURE FOR BACTERIA: CPT | Performed by: INTERNAL MEDICINE

## 2023-02-25 PROCEDURE — 82803 BLOOD GASES ANY COMBINATION: CPT | Performed by: INTERNAL MEDICINE

## 2023-02-25 PROCEDURE — 250N000013 HC RX MED GY IP 250 OP 250 PS 637: Performed by: INTERNAL MEDICINE

## 2023-02-25 PROCEDURE — 120N000001 HC R&B MED SURG/OB

## 2023-02-25 RX ORDER — SODIUM CHLORIDE 9 MG/ML
INJECTION, SOLUTION INTRAVENOUS CONTINUOUS
Status: DISCONTINUED | OUTPATIENT
Start: 2023-02-25 | End: 2023-02-27 | Stop reason: HOSPADM

## 2023-02-25 RX ADMIN — HYDROMORPHONE HYDROCHLORIDE 0.2 MG: 0.2 INJECTION, SOLUTION INTRAMUSCULAR; INTRAVENOUS; SUBCUTANEOUS at 20:08

## 2023-02-25 RX ADMIN — SODIUM CHLORIDE, POTASSIUM CHLORIDE, SODIUM LACTATE AND CALCIUM CHLORIDE: 600; 310; 30; 20 INJECTION, SOLUTION INTRAVENOUS at 20:52

## 2023-02-25 RX ADMIN — PREGABALIN 100 MG: 100 CAPSULE ORAL at 08:55

## 2023-02-25 RX ADMIN — SODIUM CHLORIDE: 9 INJECTION, SOLUTION INTRAVENOUS at 21:35

## 2023-02-25 RX ADMIN — CEFTRIAXONE 2 G: 2 INJECTION, POWDER, FOR SOLUTION INTRAMUSCULAR; INTRAVENOUS at 12:05

## 2023-02-25 RX ADMIN — MULTIPLE VITAMINS W/ MINERALS TAB 1 TABLET: TAB at 08:47

## 2023-02-25 RX ADMIN — PREGABALIN 100 MG: 100 CAPSULE ORAL at 20:07

## 2023-02-25 RX ADMIN — DULOXETINE HYDROCHLORIDE 60 MG: 60 CAPSULE, DELAYED RELEASE ORAL at 08:46

## 2023-02-25 RX ADMIN — ACETAMINOPHEN 650 MG: 325 TABLET ORAL at 12:13

## 2023-02-25 RX ADMIN — FOLIC ACID 1 MG: 1 TABLET ORAL at 08:47

## 2023-02-25 RX ADMIN — THIAMINE HCL TAB 100 MG 100 MG: 100 TAB at 08:46

## 2023-02-25 RX ADMIN — SODIUM CHLORIDE, POTASSIUM CHLORIDE, SODIUM LACTATE AND CALCIUM CHLORIDE: 600; 310; 30; 20 INJECTION, SOLUTION INTRAVENOUS at 05:12

## 2023-02-25 ASSESSMENT — ACTIVITIES OF DAILY LIVING (ADL)
ADLS_ACUITY_SCORE: 22

## 2023-02-25 NOTE — PROGRESS NOTES
"Patient got acutely confused and started pulling her IV out  I went to examine the patient when the nurse paged me    BP (!) 188/88 (BP Location: Right arm, Patient Position: Supine)   Pulse 112   Temp 99.1  F (37.3  C) (Temporal)   Resp 24   Ht 1.651 m (5' 5\")   Wt 78.7 kg (173 lb 8 oz)   SpO2 93%   BMI 28.87 kg/m      Patient looks confused and jittery  She had generalized sweating  Heart S1-S2 tachycardia is present  Lungs are clear to auscultation  Patient looks confused I did not see much for tremors    I think the patient is having alcohol withdrawal  Started CIWA protocol  Need to reevaluate in the morning.    Terence Han MD        "

## 2023-02-25 NOTE — PLAN OF CARE
"Care from [id number]    Blood pressure 139/70, pulse 102, temperature 96.9  F (36.1  C), temperature source Temporal, resp. rate 18, height 1.651 m (5' 5\"), weight 78.7 kg (173 lb 8 oz), SpO2 95 %.    Admit Date: 2/23/2023  Admitting Diagnosis: Pyelonephritis, UTI  Neuro: Alert and Oriented x4.   Activity: are independent with Gait Belt  Telemetry Monitoring: No  Pain: denying pain.  Labs / Tests: Mag and K protocol. Phos and Na are low.  GI: WDL  : Had Purewick in place. Bladder scan showed 717 mL. Pt ambulated to the bathroom and voided 900 mL.  Fluids: has Lactated Ringer's running at 125 mL per hour.  Diet: Diabetic  Consults:   Treatment:  Discharge Disposition: TBD    Pt on CIWA assessments. Pts score had improved from previous CIWA. Pt appears and states she is able to think more clearly.  "

## 2023-02-25 NOTE — PLAN OF CARE
Goal Outcome Evaluation:               Writer got in room  and pt was walking form bathroom IV was pulled, blood noted  in her LE,  in bathroom floor. Pt said that she was ging to use bathroom. No bed alarm , pt is I in room.pt was diaphoretic. , c/o dizziness, headache 7/10; N/V. BP was 188/80. RR-24. Sat on 2L oxygen 93 %. HR-112-113. BG checked -175.pt was disoriented to time  and date. Writer susanne FRENCH and updated about pt. STAT lab was order. PRN Ativan given.  PRN Zofran ODT  given. New IV started to R/fore arm.  per order infusing.

## 2023-02-25 NOTE — PLAN OF CARE
Goal Outcome Evaluation:               CI WA monitoring started per MD order. Ativan was given IV, which was effective.Mg and phos. Was rechecked per order,Mg  -2 phos-2.

## 2023-02-25 NOTE — PROGRESS NOTES
"[facility]    Medicine Progress Note - Hospitalist Service    Date of Admission:  2/23/2023    Assessment & Plan      Britt Holder is a 57 year old female with PMHx significant for DM type II, fibromyalgia, IBS and hx of migraines, who was admitted on 2/23/2023 with pyelonephritis.     Pyelonephritis   -Sepsis due to UTI with gram-negative bacteremia  -Continue antibiotics currently on 2 g of ceftriaxone intravenously daily  -Appreciate prompt input from urology service. Close monitoring.  No plans for any operative intervention or approach for now  -Advance diet  -Leukocytosis improving  -Follow cultures and sensitivity panel, likely an E. Coli  -As needed APAP  -Supportive care    NICOLAS secondary to underlying sepsis and pyelonephritis/UTI  -On IV fluid    DM type II non-insulin-requiring  -Check A1c  -Metformin held due to NICOLAS and lactic acidosis  -May be able to resume in the next coming days as she also received contrast study during admission process  -Jardiance discontinued.  Unfortunately this medication also has side effects of increasing chance of sepsis related to UTI and failure nephritis    Fibromyalgia  Continue home Cymbalta and Lyrica    Hypomagnesemia   Improving. Magnesium 2.1 today  - replace per protocol       Diet: Moderate Consistent Carb (60 g CHO per Meal) Diet    DVT Prophylaxis: Pneumatic Compression Devices and Ambulate every shift  Baldwin Catheter: Not present  Lines: None     Cardiac Monitoring: None  Code Status: Full Code      Clinically Significant Risk Factors                        # DMII: A1C = 8.8 % (Ref range: <5.7 %) within past 6 months, PRESENT ON ADMISSION  # Overweight: Estimated body mass index is 28.87 kg/m  as calculated from the following:    Height as of this encounter: 1.651 m (5' 5\").    Weight as of this encounter: 78.7 kg (173 lb 8 oz)., PRESENT ON ADMISSION         Disposition Plan     Expected Discharge Date: Anticipating 1-2 nights of hospital " course until her symptoms improve.        Carmela Garrison MD, MD  Hospitalist Service  [facility]  Securely message with [employer] (more info)  Text page via [employer] Paging/Directory   ______________________________________________________________________    Interval History   Patient seen and examined. Today. She stated that she is feeling much better today. She denies fever. She has no nausea or vomiting. No abdominal pain. Also denies flank pain. Has no fever.     Physical Exam   Vital Signs: Temp: (!) 96.7  F (35.9  C) Temp src: Temporal BP: (!) 142/82 Pulse: 83   Resp: 18 SpO2: 95 % O2 Device: None (Room air) Oxygen Delivery: 1 LPM  Weight: 173 lbs 8 oz    HEENT; Atraumatic, normocephalic, pinkish conjuctiva, pupils bilateral reactive   Skin: warm and moist, no rashes  Lymphatics: no cervical or axillary lymphandenopathy  Lungs: equal chest expansion, clear to auscultation, no wheezes, no stridor, no crackles,   Heart: normal rate, normal rhythm, no rubs or gallops.   Abdomen: normal bowel sounds, no tenderness, no peritoneal signs, no guarding  Extremities: no deformities, no edema   Neuro; follow commands, alert and oriented x3, spontaneous speech, coherent, moves all extremities spontaneously  Psych; no hallucination, euthymic mood, not agitated        Medical Decision Making       50 MINUTES SPENT BY ME on the date of service doing chart review, history, exam, documentation & further activities per the note.  MANAGEMENT DISCUSSED with the following over the past 24 hours: yes   NOTE(S)/MEDICAL RECORDS REVIEWED over the past 24 hours: yes  Tests ORDERED & REVIEWED in the past 24 hours:      Data     I have personally reviewed the following data over the past 24 hrs:    16.6 (H)  \   10.5 (L)   / 141 (L)     135 (L) 100 24.8 (H) /  125 (H)   4.0 20 (L) 0.88 \       Imaging results reviewed over the past 24 hrs:   No results found for this or any previous visit (from the past 24  hour(s)).

## 2023-02-25 NOTE — PLAN OF CARE
Goal Outcome Evaluation:      Plan of Care Reviewed With: patient, parent    Overall Patient Progress: improvingOverall Patient Progress: improving       Cared for pt. [id number]    VSS. Afebrile. AO x4. O2 2 LPM via NC, attempted to wean pt. Unsuccessful due to dropping below 92%. Voiding. Pt. Needs to be alone to be able to void completely. No straight catheterization this shift. IVF LR running at 125. Pt. Is up in the room independently and was educated on how to ambulate safely with oxygen tubing and IV pole. Educated on calling for help if needed. Pt. Is using IV dilaudid for pain management. No discharge plan at this time.

## 2023-02-26 LAB
ANION GAP SERPL CALCULATED.3IONS-SCNC: 15 MMOL/L (ref 7–15)
BACTERIA BLD CULT: ABNORMAL
BASOPHILS # BLD AUTO: 0.1 10E3/UL (ref 0–0.2)
BASOPHILS NFR BLD AUTO: 0 %
BUN SERPL-MCNC: 17.8 MG/DL (ref 6–20)
CALCIUM SERPL-MCNC: 8.1 MG/DL (ref 8.6–10)
CHLORIDE SERPL-SCNC: 102 MMOL/L (ref 98–107)
CREAT SERPL-MCNC: 0.71 MG/DL (ref 0.51–0.95)
DEPRECATED HCO3 PLAS-SCNC: 20 MMOL/L (ref 22–29)
EOSINOPHIL # BLD AUTO: 0.1 10E3/UL (ref 0–0.7)
EOSINOPHIL NFR BLD AUTO: 1 %
ERYTHROCYTE [DISTWIDTH] IN BLOOD BY AUTOMATED COUNT: 13.5 % (ref 10–15)
GFR SERPL CREATININE-BSD FRML MDRD: >90 ML/MIN/1.73M2
GLUCOSE BLDC GLUCOMTR-MCNC: 113 MG/DL (ref 70–99)
GLUCOSE BLDC GLUCOMTR-MCNC: 127 MG/DL (ref 70–99)
GLUCOSE BLDC GLUCOMTR-MCNC: 135 MG/DL (ref 70–99)
GLUCOSE BLDC GLUCOMTR-MCNC: 146 MG/DL (ref 70–99)
GLUCOSE BLDC GLUCOMTR-MCNC: 165 MG/DL (ref 70–99)
GLUCOSE SERPL-MCNC: 161 MG/DL (ref 70–99)
HCT VFR BLD AUTO: 32.9 % (ref 35–47)
HGB BLD-MCNC: 10.7 G/DL (ref 11.7–15.7)
IMM GRANULOCYTES # BLD: 0.1 10E3/UL
IMM GRANULOCYTES NFR BLD: 1 %
LYMPHOCYTES # BLD AUTO: 1.4 10E3/UL (ref 0.8–5.3)
LYMPHOCYTES NFR BLD AUTO: 9 %
MAGNESIUM SERPL-MCNC: 1.9 MG/DL (ref 1.7–2.3)
MCH RBC QN AUTO: 29.1 PG (ref 26.5–33)
MCHC RBC AUTO-ENTMCNC: 32.5 G/DL (ref 31.5–36.5)
MCV RBC AUTO: 89 FL (ref 78–100)
MONOCYTES # BLD AUTO: 0.8 10E3/UL (ref 0–1.3)
MONOCYTES NFR BLD AUTO: 5 %
NEUTROPHILS # BLD AUTO: 14.3 10E3/UL (ref 1.6–8.3)
NEUTROPHILS NFR BLD AUTO: 84 %
NRBC # BLD AUTO: 0 10E3/UL
NRBC BLD AUTO-RTO: 0 /100
PLATELET # BLD AUTO: 162 10E3/UL (ref 150–450)
POTASSIUM SERPL-SCNC: 3.9 MMOL/L (ref 3.4–5.3)
RBC # BLD AUTO: 3.68 10E6/UL (ref 3.8–5.2)
SODIUM SERPL-SCNC: 137 MMOL/L (ref 136–145)
WBC # BLD AUTO: 16.9 10E3/UL (ref 4–11)

## 2023-02-26 PROCEDURE — 250N000013 HC RX MED GY IP 250 OP 250 PS 637: Performed by: PHYSICIAN ASSISTANT

## 2023-02-26 PROCEDURE — 99233 SBSQ HOSP IP/OBS HIGH 50: CPT | Performed by: INTERNAL MEDICINE

## 2023-02-26 PROCEDURE — 250N000013 HC RX MED GY IP 250 OP 250 PS 637: Performed by: INTERNAL MEDICINE

## 2023-02-26 PROCEDURE — 83735 ASSAY OF MAGNESIUM: CPT | Performed by: INTERNAL MEDICINE

## 2023-02-26 PROCEDURE — 120N000001 HC R&B MED SURG/OB

## 2023-02-26 PROCEDURE — 250N000011 HC RX IP 250 OP 636: Performed by: INTERNAL MEDICINE

## 2023-02-26 PROCEDURE — 36415 COLL VENOUS BLD VENIPUNCTURE: CPT | Performed by: INTERNAL MEDICINE

## 2023-02-26 PROCEDURE — 80048 BASIC METABOLIC PNL TOTAL CA: CPT | Performed by: INTERNAL MEDICINE

## 2023-02-26 PROCEDURE — 85025 COMPLETE CBC W/AUTO DIFF WBC: CPT | Performed by: INTERNAL MEDICINE

## 2023-02-26 RX ADMIN — FOLIC ACID 1 MG: 1 TABLET ORAL at 09:04

## 2023-02-26 RX ADMIN — PREGABALIN 100 MG: 100 CAPSULE ORAL at 21:51

## 2023-02-26 RX ADMIN — MULTIPLE VITAMINS W/ MINERALS TAB 1 TABLET: TAB at 09:04

## 2023-02-26 RX ADMIN — CEFTRIAXONE 2 G: 2 INJECTION, POWDER, FOR SOLUTION INTRAMUSCULAR; INTRAVENOUS at 10:30

## 2023-02-26 RX ADMIN — DULOXETINE HYDROCHLORIDE 60 MG: 60 CAPSULE, DELAYED RELEASE ORAL at 09:04

## 2023-02-26 RX ADMIN — LORAZEPAM 1 MG: 1 TABLET ORAL at 22:00

## 2023-02-26 RX ADMIN — THIAMINE HCL TAB 100 MG 100 MG: 100 TAB at 09:04

## 2023-02-26 RX ADMIN — PREGABALIN 100 MG: 100 CAPSULE ORAL at 09:04

## 2023-02-26 RX ADMIN — LORAZEPAM 1 MG: 1 TABLET ORAL at 12:18

## 2023-02-26 ASSESSMENT — ACTIVITIES OF DAILY LIVING (ADL)
ADLS_ACUITY_SCORE: 26
ADLS_ACUITY_SCORE: 22
ADLS_ACUITY_SCORE: 26
ADLS_ACUITY_SCORE: 22
ADLS_ACUITY_SCORE: 26

## 2023-02-26 NOTE — PLAN OF CARE
Goal Outcome Evaluation:      Plan of Care Reviewed With: patient    Overall Patient Progress: improvingOverall Patient Progress: improving         Cared for pt. [id number]     VSS. Afebrile. AO x4. Room air Voiding. Pt. Needs to be alone to be able to void completely. No straight catheterization this shift. IVF NS running at 75. Pt. Is up in the room independently and was educated on how to ambulate safely with IV pole. Educated on calling for help if needed. Pt. Able to tolerate more food during lunch and dinner. Pt. declined going on a walk today. K/Mag protocol. No replacement needed. Discharge plan is 1-2 days.    Walked in on the pt. Today very teary eyed and anxious. Pt. Requested prn ativan. Ativan given.    Blood sugar checks this shift: 113, 134, and 127.    PVR bladder scans this shift: 146 and 42    Updated pts mother (Chelle) over the phone and at the bedside.

## 2023-02-26 NOTE — PLAN OF CARE
Goal Outcome Evaluation:      Plan of Care Reviewed With: patient    Overall Patient Progress: improvingOverall Patient Progress: improving         Cared for pt. [id number]     VSS. Afebrile. AO x4. O2 1 LPM via NC. Voiding. Pt. Needs to be alone to be able to void completely. No straight catheterization this shift. IVF LR running at 125. Pt. Is up in the room independently and was educated on how to ambulate safely with oxygen tubing and IV pole. Educated on calling for help if needed. Oral tylenol given for pain management. Pt. Declining food and has a low appetite. Is only interested in lemon ice and yogurt. Pt. Accepted going on one walk today. No discharge plan at this time.

## 2023-02-26 NOTE — PLAN OF CARE
Goal Outcome Evaluation:                    Pt requested pain medication for her pain 6-7/10. Dilaudid given IV, pt c/o chills. RR-24, . Sat was 89, O2 given 2 L sat 93%.MD paged.  Pt BP was 241/133HR 123, T-100.1 pt was c/o chills, anxious,  Weak.MD was paged again and updated.

## 2023-02-26 NOTE — PLAN OF CARE
Goal Outcome Evaluation:                  Pt slept, A&OX4. No chills, No diaphoresis. Pt denies pain. IV intact  continues infusing per order.  VSS. Nursing will continue monitoring.

## 2023-02-26 NOTE — PROGRESS NOTES
Cross cover note.    Called to evaluate patient at bedside due to altered mental status.  Reportedly, became very sedated after dose of IV Dilaudid.  Chart reviewed.  Fairly small dose of Dilaudid given.  Patient seen at bedside.  She initially does appear quite sedated.  However, when I mention stopping Dilaudid and possibly giving naloxone, she quickly opens her eyes.  Immediately closes her eyes.  Does not answer any questions.    Stop opiates for now.  Check VBG.  Monitor closely.  Naloxone if needed.

## 2023-02-26 NOTE — PLAN OF CARE
"Goal Outcome Evaluation:       pt requested pain medication 2/25/23 @20:00, said that her pain 7/10, writer offered Oxycodone PO, but pt refused said:\" that does not work for me.\" writer gave Dilaudid 0.2mg IV. after 5 minutes pt said she is cold,asked to  check  Room Temperature.pt was shaky, c/o pain all over the body, was feeling fatigued. BP was 241/133. T-98.5;RR24: sat O 2  On 2 L was 89, increased Oxygen to 5 L sat. was 93%.writer updated MD.       Pt is A&OX4.Pt c/o aching all over. RR-24;  c/o SOB, Oxygen 94% on 5L ./84. T-100.7 oral. Pt is diaphoretic. Voided X 2. Pt is thirsty, she drank 250 ml cold water . Pt was in tears, anxious.Rates her pain 4/10 allover. Pt was seen By MD, Blood culture was order, lactic  acid order. NS 75 infusing.         Pt T-100.0 pt still diaphoretic. Reported no pain. Pt said let me sleep couple hours. BP-144/72 RR-22 sat O2-95 on 4L, HR-112.pt voided, Bladder scan was 43. Lactic acid result was -1.7,blood culture in process.will continue monitoring.  "

## 2023-02-26 NOTE — PROGRESS NOTES
"[facility]    Medicine Progress Note - Hospitalist Service    Date of Admission:  2/23/2023    Assessment & Plan      Britt Holder is a 57 year old female with PMHx significant for DM type II, fibromyalgia, IBS and hx of migraines, who was admitted on 2/23/2023 with pyelonephritis.     Pyelonephritis   -Sepsis due to UTI with gram-negative bacteremia  -Continue antibiotics currently on 2 g of ceftriaxone intravenously daily  -Appreciate prompt input from urology service. Close monitoring.  No plans for any operative intervention or approach for now  -Advance diet  -Leukocytosis improving  -Blood culture and urine culture growing E. coli resistant to ampicillin but sensitive to the rest of antibiotics tested.    -WBC down from 35.1-> 23.8->16.6->16.9.  We will continue ceftriaxone 2 g IV daily.  -As needed APAP  -Supportive care    NICOLAS secondary to underlying sepsis and pyelonephritis/UTI  -On IV fluid    DM type II non-insulin-requiring  -Check A1c  -Metformin held due to NICOLAS and lactic acidosis  -May be able to resume in the next coming days as she also received contrast study during admission process  -Jardiance discontinued.  Unfortunately this medication also has side effects of increasing chance of sepsis related to UTI and failure nephritis    Fibromyalgia  Continue home Cymbalta and Lyrica    Hypomagnesemia   We will replace electrolytes per protocol         Diet: Moderate Consistent Carb (60 g CHO per Meal) Diet    DVT Prophylaxis: Pneumatic Compression Devices and Ambulate every shift  Baldwin Catheter: Not present  Lines: None     Cardiac Monitoring: None  Code Status: Full Code      Clinically Significant Risk Factors     # DMII: A1C = 8.8 % (Ref range: <5.7 %) within past 6 months, PRESENT ON ADMISSION  # Overweight: Estimated body mass index is 28.87 kg/m  as calculated from the following:    Height as of this encounter: 1.651 m (5' 5\").    Weight as of this encounter: 78.7 kg (173 " lb 8 oz)., PRESENT ON ADMISSION         Disposition Plan     Expected Discharge Date: Anticipating 1-2 nights of hospital course until her symptoms improve and labs normalize.        Carmela Garrison MD, MD  Hospitalist Service  [facility]  Securely message with [email or web address] (more info)  Text page via [facility] Paging/Directory   ______________________________________________________________________    Interval History   Patient seen and examined. Today. She stated that she is feeling much better today. She denies fever. She has no nausea or vomiting. No abdominal pain. Also denies flank pain. Has no fever.     Physical Exam   Vital Signs: Temp: 97  F (36.1  C) Temp src: Temporal BP: (!) 141/72 Pulse: 88   Resp: 20 SpO2: 96 % O2 Device: Nasal cannula Oxygen Delivery: 4 LPM  Weight: 173 lbs 8 oz    HEENT; Atraumatic, normocephalic, pinkish conjuctiva, pupils bilateral reactive   Skin: warm and moist, no rashes  Lymphatics: no cervical or axillary lymphandenopathy  Lungs: equal chest expansion, clear to auscultation, no wheezes, no stridor, no crackles,   Heart: normal rate, normal rhythm, no rubs or gallops.   Abdomen: normal bowel sounds, no tenderness, no peritoneal signs, no guarding  Extremities: no deformities, no edema   Neuro; follow commands, alert and oriented x3, spontaneous speech, coherent, moves all extremities spontaneously  Psych; no hallucination, euthymic mood, not agitated        Medical Decision Making       50 MINUTES SPENT BY ME on the date of service doing chart review, history, exam, documentation & further activities per the note.  MANAGEMENT DISCUSSED with the following over the past 24 hours: yes   NOTE(S)/MEDICAL RECORDS REVIEWED over the past 24 hours: yes  Tests ORDERED & REVIEWED in the past 24 hours:      Data     I have personally reviewed the following data over the past 24 hrs:    16.9 (H)  \   10.7 (L)   / 162     137 102 17.8 /  113 (H)   3.9 20 (L) 0.71 \        Procal: N/A CRP: N/A Lactic Acid: 1.7         Imaging results reviewed over the past 24 hrs:   No results found for this or any previous visit (from the past 24 hour(s)).

## 2023-02-27 VITALS
BODY MASS INDEX: 28.91 KG/M2 | HEART RATE: 90 BPM | WEIGHT: 173.5 LBS | SYSTOLIC BLOOD PRESSURE: 150 MMHG | RESPIRATION RATE: 18 BRPM | HEIGHT: 65 IN | DIASTOLIC BLOOD PRESSURE: 80 MMHG | TEMPERATURE: 97.5 F | OXYGEN SATURATION: 95 %

## 2023-02-27 DIAGNOSIS — N13.30 HYDRONEPHROSIS, UNSPECIFIED HYDRONEPHROSIS TYPE: Primary | ICD-10-CM

## 2023-02-27 DIAGNOSIS — N12 PYELONEPHRITIS: ICD-10-CM

## 2023-02-27 LAB
ANION GAP SERPL CALCULATED.3IONS-SCNC: 14 MMOL/L (ref 7–15)
BASOPHILS # BLD AUTO: 0.1 10E3/UL (ref 0–0.2)
BASOPHILS NFR BLD AUTO: 1 %
BUN SERPL-MCNC: 14.4 MG/DL (ref 6–20)
CALCIUM SERPL-MCNC: 7.7 MG/DL (ref 8.6–10)
CHLORIDE SERPL-SCNC: 104 MMOL/L (ref 98–107)
CREAT SERPL-MCNC: 0.64 MG/DL (ref 0.51–0.95)
DEPRECATED HCO3 PLAS-SCNC: 21 MMOL/L (ref 22–29)
EOSINOPHIL # BLD AUTO: 0.4 10E3/UL (ref 0–0.7)
EOSINOPHIL NFR BLD AUTO: 3 %
ERYTHROCYTE [DISTWIDTH] IN BLOOD BY AUTOMATED COUNT: 13.4 % (ref 10–15)
GFR SERPL CREATININE-BSD FRML MDRD: >90 ML/MIN/1.73M2
GLUCOSE BLDC GLUCOMTR-MCNC: 113 MG/DL (ref 70–99)
GLUCOSE BLDC GLUCOMTR-MCNC: 122 MG/DL (ref 70–99)
GLUCOSE SERPL-MCNC: 125 MG/DL (ref 70–99)
HCT VFR BLD AUTO: 33.4 % (ref 35–47)
HGB BLD-MCNC: 10.8 G/DL (ref 11.7–15.7)
IMM GRANULOCYTES # BLD: 0.4 10E3/UL
IMM GRANULOCYTES NFR BLD: 3 %
LYMPHOCYTES # BLD AUTO: 2.3 10E3/UL (ref 0.8–5.3)
LYMPHOCYTES NFR BLD AUTO: 19 %
MAGNESIUM SERPL-MCNC: 1.7 MG/DL (ref 1.7–2.3)
MCH RBC QN AUTO: 28.7 PG (ref 26.5–33)
MCHC RBC AUTO-ENTMCNC: 32.3 G/DL (ref 31.5–36.5)
MCV RBC AUTO: 89 FL (ref 78–100)
MONOCYTES # BLD AUTO: 1 10E3/UL (ref 0–1.3)
MONOCYTES NFR BLD AUTO: 9 %
NEUTROPHILS # BLD AUTO: 7.7 10E3/UL (ref 1.6–8.3)
NEUTROPHILS NFR BLD AUTO: 65 %
NRBC # BLD AUTO: 0 10E3/UL
NRBC BLD AUTO-RTO: 0 /100
PLATELET # BLD AUTO: 191 10E3/UL (ref 150–450)
POTASSIUM SERPL-SCNC: 3.3 MMOL/L (ref 3.4–5.3)
RBC # BLD AUTO: 3.76 10E6/UL (ref 3.8–5.2)
SODIUM SERPL-SCNC: 139 MMOL/L (ref 136–145)
WBC # BLD AUTO: 11.9 10E3/UL (ref 4–11)

## 2023-02-27 PROCEDURE — 250N000011 HC RX IP 250 OP 636: Performed by: INTERNAL MEDICINE

## 2023-02-27 PROCEDURE — 99239 HOSP IP/OBS DSCHRG MGMT >30: CPT | Performed by: INTERNAL MEDICINE

## 2023-02-27 PROCEDURE — 80048 BASIC METABOLIC PNL TOTAL CA: CPT | Performed by: INTERNAL MEDICINE

## 2023-02-27 PROCEDURE — 250N000013 HC RX MED GY IP 250 OP 250 PS 637: Performed by: PHYSICIAN ASSISTANT

## 2023-02-27 PROCEDURE — 85025 COMPLETE CBC W/AUTO DIFF WBC: CPT | Performed by: INTERNAL MEDICINE

## 2023-02-27 PROCEDURE — 250N000013 HC RX MED GY IP 250 OP 250 PS 637: Performed by: INTERNAL MEDICINE

## 2023-02-27 PROCEDURE — 36415 COLL VENOUS BLD VENIPUNCTURE: CPT | Performed by: INTERNAL MEDICINE

## 2023-02-27 PROCEDURE — 83735 ASSAY OF MAGNESIUM: CPT | Performed by: INTERNAL MEDICINE

## 2023-02-27 RX ORDER — POTASSIUM CHLORIDE 1500 MG/1
40 TABLET, EXTENDED RELEASE ORAL ONCE
Status: COMPLETED | OUTPATIENT
Start: 2023-02-27 | End: 2023-02-27

## 2023-02-27 RX ORDER — CIPROFLOXACIN 500 MG/1
500 TABLET, FILM COATED ORAL 2 TIMES DAILY
Qty: 12 TABLET | Refills: 0 | Status: SHIPPED | OUTPATIENT
Start: 2023-02-27 | End: 2023-03-05

## 2023-02-27 RX ORDER — ESTRADIOL 0.1 MG/G
2 CREAM VAGINAL
Qty: 42.5 G | Refills: 0 | Status: SHIPPED | OUTPATIENT
Start: 2023-02-27 | End: 2023-04-11

## 2023-02-27 RX ADMIN — POTASSIUM CHLORIDE 40 MEQ: 1500 TABLET, EXTENDED RELEASE ORAL at 10:58

## 2023-02-27 RX ADMIN — MULTIPLE VITAMINS W/ MINERALS TAB 1 TABLET: TAB at 09:36

## 2023-02-27 RX ADMIN — CEFTRIAXONE 2 G: 2 INJECTION, POWDER, FOR SOLUTION INTRAMUSCULAR; INTRAVENOUS at 09:46

## 2023-02-27 RX ADMIN — DULOXETINE HYDROCHLORIDE 60 MG: 60 CAPSULE, DELAYED RELEASE ORAL at 09:36

## 2023-02-27 RX ADMIN — FOLIC ACID 1 MG: 1 TABLET ORAL at 09:37

## 2023-02-27 RX ADMIN — THIAMINE HCL TAB 100 MG 100 MG: 100 TAB at 09:37

## 2023-02-27 RX ADMIN — PREGABALIN 100 MG: 100 CAPSULE ORAL at 09:36

## 2023-02-27 ASSESSMENT — ACTIVITIES OF DAILY LIVING (ADL)
ADLS_ACUITY_SCORE: 26
ADLS_ACUITY_SCORE: 26
ADLS_ACUITY_SCORE: 22
ADLS_ACUITY_SCORE: 26
ADLS_ACUITY_SCORE: 26
ADLS_ACUITY_SCORE: 22

## 2023-02-27 NOTE — PROGRESS NOTES
UROLOGY CHART NOTE  NICOLAS resolved, creatinine 0.64 eGFR >90.  Leukocytosis almost resolved as well.  WBC 11.9 (16.9 (16.6 (23.8 (25.5)))).  Repeat blood cultures no growth to date.  Initial blood cultures positive for E. coli.  - no acute urologic intervention  - continue abx,follow cultures  - recommend avoiding SGLT2 inhibitors in this patient if possible  - continue bladder scan post void residuals to ensure bladder emptying  - start vaginal estrogen three times a week and continue indefinitely  - return 2-3 months with CT urogram for office cystoscopy--Our office will coordinate.    We will plan on signing off.  Please contact us with any urological concerns.    Genoveva Logan PA-C  [facility]   [phone]

## 2023-02-27 NOTE — PLAN OF CARE
Goal Outcome Evaluation:                      Pt is A&OX4, on RA, LS CTA upper lobs, faint crackles noted to basal areas, encouraged to use IS.  Pt at bed time c/o  anxiety  and chronic back pain, but did not take tylenol, pt refused. PRN Ativan given. Pt slept all night. Did not noted  tremors, sweating, chills, no headache. IV  NS 75 ml/hs done and  pt oral intake is good.writer paged MD.  IV infusion stopped per MD order. No edema to LEs, pt ambulated to bathroom voided 600 ml.pt is Mg++ and K+. ( Is in Normal range).BG @ 02 AM was 122. Pt is Independent in room.

## 2023-02-27 NOTE — DISCHARGE SUMMARY
[facility]    Discharge Summary  Hospitalist    Date of Admission:  2/23/2023  Date of Discharge:  2/27/2023  Discharging Provider: Carmela Garrison MD, MD  Date of Service (when I saw the patient): 02/27/23    Discharge Diagnoses     Pyelonephritis   Sepsis due to UTI with E.coli bactremia    Acute respiratory failure with hypoxia secondary to above, resolved       NICOLAS secondary to underlying sepsis and pyelonephritis/UTI     DM type II non-insulin-requiring     Fibromyalgia    Hypomagnesemia, replaced    Hypokalemia, replaced    History of Present Illness   Britt Holder is an 57 year old female who presented with pyelonephritis with sepsis. Please see hospital course for details    Hospital Course   Britt Holder is a 57 year old female with PMHx significant for DM type II, fibromyalgia, IBS and hx of migraines, who was admitted on 2/23/2023 with pyelonephritis.      Pyelonephritis   -Sepsis due to UTI with E. coli.    -Continued antibiotics currently on 2 g of ceftriaxone intravenously daily  -Appreciate prompt input from urology service. Close monitoring.  No plans for any operative intervention or approach for now  -Advance diet  -Leukocytosis improving  -Blood culture and urine culture growing E. coli resistant to ampicillin but sensitive to the rest of antibiotics tested.    -WBC down from 35.1-> 23.8->16.6->16.9-->11.9.  Treated with ceftriaxone 2 g IV daily during hospital course.  Tolerated ciprofloxacin in the past.  She was discharged on oral ciprofloxacin to complete 10-day course of antibiotic treatment.  She was advised to follow-up with urology as outpatient in 2 to 3 months this with CT urogram followed by cystoscopy.  This will be coordinated by urology office.    NICOLAS secondary to underlying sepsis and pyelonephritis/UTI  -On IV fluid     DM type II non-insulin-requiring  -Check A1c  -Metformin held due to NICOLAS and lactic acidosis  -May be able to resume in the next  coming days as she also received contrast study during admission process  -Jardiance discontinued.  Unfortunately this medication also has side effects of increasing chance of sepsis related to UTI and failure nephritis     Fibromyalgia  Continue home Cymbalta and Lyrica     Hypomagnesemia   replaced electrolytes per protocol     Significant Results and Procedures   Results for orders placed or performed during the hospital encounter of 02/23/23   XR Chest 2 Views    Narrative    CHEST TWO VIEWS 2/23/2023 11:44 AM     HISTORY: Nausea, vomiting, diarrhea, and headache    COMPARISON: None.       Impression    IMPRESSION: There are no acute infiltrates. The cardiac silhouette is  not enlarged. Pulmonary vasculature is unremarkable.    CATHY SILVA MD         SYSTEM ID:  [id number]   CT Abdomen Pelvis w Contrast    Narrative    CT ABDOMEN AND PELVIS WITH CONTRAST 2/23/2023 11:27 AM    CLINICAL HISTORY: Abdominal pain.     TECHNIQUE: CT scan of the abdomen and pelvis was performed following  injection of IV contrast. Multiplanar reformats were obtained. Dose  reduction techniques were used.  CONTRAST: 91mL [id number]-370    COMPARISON: December 8, 2016    FINDINGS:   LOWER CHEST: Minimal dependent probable atelectasis on the right,  trace pleural fluid on the right. Left lung base clear.    HEPATOBILIARY: Normal contour with no significant mass. No bile duct  dilatation. Calcified gallstones. Moderate hepatic steatosis.    PANCREAS: No significant mass, duct dilatation, or inflammatory  change.    SPLEEN: Normal size.    ADRENAL GLANDS: No significant nodules.    KIDNEYS/BLADDER: Perinephric stranding and urothelial enhancement on  the right with areas of decreased enhancement in the renal parenchyma  concerning for pyelonephritis. Left kidney unremarkable. No  urolithiasis demonstrated.    BOWEL: No obstruction or inflammatory change.    PELVIC ORGANS: No pelvic masses.    ADDITIONAL FINDINGS: No free air. No free fluid. A  few mildly  prominent retroperitoneal lymph nodes are noted, but these are similar  to previous and may be reactive in this setting.    MUSCULOSKELETAL: No frankly destructive bony lesions.      Impression    IMPRESSION:   1.  Right-sided perinephric stranding, mild hydronephrosis, and  urothelial enhancement concerning for pyelonephritis, clinical  correlation needed. Alternatively, this appearance could be related to  recently passed stone.    CATHY SILVA MD         SYSTEM ID:  [id number]   CT Head w/o Contrast    Narrative    CT OF THE HEAD WITHOUT CONTRAST 2/23/2023 11:28 AM     COMPARISON: None    HISTORY: Headache. Acute headache (< three months), no complicating  features.    TECHNIQUE: 5 mm thick axial CT images of the head were acquired  without IV contrast material.    FINDINGS: There is mild diffuse cerebral volume loss. There are subtle  patchy areas of decreased density in the cerebral white matter  bilaterally that are consistent with sequela of chronic small vessel  ischemic disease.     The ventricles and basal cisterns are within normal limits in  configuration given the degree of cerebral volume loss. There is no  midline shift. There are no extra-axial fluid collections.     No intracranial hemorrhage, mass or recent infarct.    The visualized paranasal sinuses are well-aerated. There is no  mastoiditis. There are no fractures of the visualized bones.       Impression    IMPRESSION: Diffuse cerebral volume loss and cerebral white matter  changes consistent with chronic small vessel ischemic disease. No  evidence for acute intracranial pathology.         Radiation dose for this scan was reduced using automated exposure  control, adjustment of the mA and/or kV according to patient size, or  iterative reconstruction technique    MARZENA HOLDEN MD         SYSTEM ID:  [id number]         Pending Results   None  Code Status   Full Code       Primary Care Physician   ARNAUD PATTERSON        Discharge  Disposition   Discharged to home  Condition at discharge: Stable    Consultations This Hospital Stay   UROLOGY IP CONSULT    Time Spent on this Encounter   I, Carmela Garrison MD, MD, personally saw the patient today and spent greater than 30 minutes discharging this patient.    Discharge Orders      Reason for your hospital stay    Pyelonephritis     Activity    Your activity upon discharge: activity as tolerated     Follow-up and recommended labs and tests     Follow up with primary care provider, ARNAUD PATTERSON, within 7 days  Follow up with urology in 2-3 months with CT urogram for office cystoscopy     Diet    Follow this diet upon discharge: Orders Placed This Encounter      Moderate Consistent Carb (60 g CHO per Meal) Diet     Discharge Medications   Current Discharge Medication List      START taking these medications    Details   ciprofloxacin (CIPRO) 500 MG tablet Take 1 tablet (500 mg) by mouth 2 times daily for 6 days  Qty: 12 tablet, Refills: 0    Associated Diagnoses: Sepsis, due to unspecified organism, unspecified whether acute organ dysfunction present (H); Pyelonephritis, acute      estradiol (ESTRACE) 0.1 MG/GM vaginal cream Place 2 g vaginally three times a week  Qty: 42.5 g, Refills: 0    Associated Diagnoses: Vulvar atrophy         CONTINUE these medications which have NOT CHANGED    Details   DULoxetine (CYMBALTA) 60 MG capsule Take 60 mg by mouth daily      ibuprofen (ADVIL/MOTRIN) 200 MG tablet Take 600 mg by mouth as needed for pain      metFORMIN (GLUCOPHAGE XR) 500 MG 24 hr tablet Take 1,000 mg by mouth daily (with dinner)      Pregabalin (LYRICA PO) Take 100 mg by mouth 2 times daily          STOP taking these medications       empagliflozin (JARDIANCE) 10 MG TABS tablet Comments:   Reason for Stopping:               Allergies   Allergies   Allergen Reactions     Blood-Group Specific Substance      Patient has a Warm Auto Antibody. Blood products may be delayed. Draw 2 purple and 1  red tube for each Type and Screen and/or Type and Crossmatch order.     Compazine      Cortisone Hives     Including injectable     Cortizone      Dicyclomine      Gabapentin      Imitrex [Sumatriptan] Swelling     Sulfa Drugs Rash     Data   Most Recent 3 CBC's:Recent Labs   Lab Test 02/27/23  0726 02/26/23  0605 02/25/23  0557   WBC 11.9* 16.9* 16.6*   HGB 10.8* 10.7* 10.5*   MCV 89 89 91    162 141*      Most Recent 3 BMP's:  Recent Labs   Lab Test 02/27/23  0923 02/27/23  0726 02/27/23  0159 02/26/23  0909 02/26/23  0605 02/25/23  0931 02/25/23  0557   NA  --  139  --   --  137  --  135*   POTASSIUM  --  3.3*  --   --  3.9  --  4.0   CHLORIDE  --  104  --   --  102  --  100   CO2  --  21*  --   --  20*  --  20*   BUN  --  14.4  --   --  17.8  --  24.8*   CR  --  0.64  --   --  0.71  --  0.88   ANIONGAP  --  14  --   --  15  --  15   DAMARIS  --  7.7*  --   --  8.1*  --  8.5*   * 125* 122*   < > 161*   < > 151*    < > = values in this interval not displayed.     Most Recent 2 LFT's:  Recent Labs   Lab Test 02/23/23  1012 12/08/16  1505   AST 20 8   ALT 15 17   ALKPHOS 110* 81   BILITOTAL 0.7 0.6     Most Recent INR's and Anticoagulation Dosing History:  Anticoagulation Dose History    There is no flowsheet data to display.       Most Recent 3 Troponin's:  Recent Labs   Lab Test 12/08/16  1831   TROPONIN 0.00     Most Recent Cholesterol Panel:No lab results found.  Most Recent 6 Bacteria Isolates From Any Culture (See EPIC Reports for Culture Details):  Recent Labs   Lab Test 12/08/16  2119   CULT No growth     Most Recent TSH, T4 and A1c Labs:  Recent Labs   Lab Test 02/24/23  0558   A1C 8.8*

## 2023-02-28 NOTE — PROGRESS NOTES
Patient's After Visit Summary was reviewed with patient.   Patient verbalized understanding of After Visit Summary, recommended follow up and was given an opportunity to ask questions.   Discharge medications sent home with patient/family: Not applicable  - Will  prescription at   [facility]  Discharged with mother      Pt Discharged with all her belongings and paper work

## 2023-02-28 NOTE — PLAN OF CARE
Goal Outcome Evaluation:      Plan of Care Reviewed With: patient        Pt A&O x 4. VSS. Up with SBA. On room air. BS+. Denies pain, SOB or nausea. PIV SL

## 2023-03-01 ENCOUNTER — PATIENT OUTREACH (OUTPATIENT)
Dept: CARE COORDINATION | Facility: CLINIC | Age: 58
End: 2023-03-01
Payer: MEDICARE

## 2023-03-01 NOTE — PROGRESS NOTES
Connected Care Resource Center Contact  [facility]/Voicemail     Clinical Data: Transitional Care Management Outreach     Outreach attempted x 2.  Left message on patient's voicemail, providing [facility]'s 24/7 scheduling and nurse triage phone number [phone] ([phone]) for questions/concerns and/or to schedule an appt with an [facility] provider, if they do not have a PCP.      Plan:  [facility] will do no further outreaches at this time.       TOMMY Coronado  Connected Care Resource [place], [facility]    *Connected Care Resource Team does NOT follow patient ongoing. Referrals are identified based on internal discharge reports and the outreach is to ensure patient has an understanding of their discharge instructions.

## 2023-03-03 LAB
BACTERIA BLD CULT: NO GROWTH
BACTERIA BLD CULT: NO GROWTH

## 2023-04-11 DIAGNOSIS — N90.5 VULVAR ATROPHY: ICD-10-CM

## 2023-04-11 RX ORDER — ESTRADIOL 0.1 MG/G
1 CREAM VAGINAL
Qty: 42.5 G | Refills: 3 | Status: SHIPPED | OUTPATIENT
Start: 2023-04-12

## 2023-05-12 ENCOUNTER — HOSPITAL ENCOUNTER (OUTPATIENT)
Dept: CT IMAGING | Facility: CLINIC | Age: 58
Discharge: HOME | End: 2023-05-12
Attending: PHYSICIAN ASSISTANT | Admitting: PHYSICIAN ASSISTANT
Payer: MEDICARE

## 2023-05-12 DIAGNOSIS — N12 PYELONEPHRITIS: ICD-10-CM

## 2023-05-12 DIAGNOSIS — N13.30 HYDRONEPHROSIS, UNSPECIFIED HYDRONEPHROSIS TYPE: ICD-10-CM

## 2023-05-12 LAB
CREAT BLD-MCNC: 0.9 MG/DL (ref 0.5–1)
GFR SERPL CREATININE-BSD FRML MDRD: >60 ML/MIN/1.73M2

## 2023-05-12 PROCEDURE — 250N000011 HC RX IP 250 OP 636: Performed by: PHYSICIAN ASSISTANT

## 2023-05-12 PROCEDURE — 250N000009 HC RX 250: Performed by: PHYSICIAN ASSISTANT

## 2023-05-12 PROCEDURE — G1010 CDSM STANSON: HCPCS

## 2023-05-12 PROCEDURE — 82565 ASSAY OF CREATININE: CPT

## 2023-05-12 RX ORDER — IOPAMIDOL 755 MG/ML
500 INJECTION, SOLUTION INTRAVASCULAR ONCE
Status: COMPLETED | OUTPATIENT
Start: 2023-05-12 | End: 2023-05-12

## 2023-05-12 RX ADMIN — IOPAMIDOL 86 ML: 755 INJECTION, SOLUTION INTRAVENOUS at 13:14

## 2023-05-12 RX ADMIN — SODIUM CHLORIDE 95 ML: 9 INJECTION, SOLUTION INTRAVENOUS at 13:16

## 2023-05-25 ENCOUNTER — OFFICE VISIT (OUTPATIENT)
Dept: UROLOGY | Facility: CLINIC | Age: 58
End: 2023-05-25
Payer: MEDICARE

## 2023-05-25 VITALS
SYSTOLIC BLOOD PRESSURE: 134 MMHG | HEIGHT: 65 IN | BODY MASS INDEX: 28.82 KG/M2 | DIASTOLIC BLOOD PRESSURE: 84 MMHG | WEIGHT: 173 LBS

## 2023-05-25 DIAGNOSIS — N39.0 RECURRENT UTI: Primary | ICD-10-CM

## 2023-05-25 DIAGNOSIS — K80.20 GALL STONES: ICD-10-CM

## 2023-05-25 DIAGNOSIS — M54.9 BACK PAIN, UNSPECIFIED BACK LOCATION, UNSPECIFIED BACK PAIN LATERALITY, UNSPECIFIED CHRONICITY: ICD-10-CM

## 2023-05-25 DIAGNOSIS — Z71.6 ENCOUNTER FOR SMOKING CESSATION COUNSELING: ICD-10-CM

## 2023-05-25 DIAGNOSIS — N20.0 NEPHROLITHIASIS: ICD-10-CM

## 2023-05-25 DIAGNOSIS — Q62.5 DUPLICATED COLLECTING SYSTEM: ICD-10-CM

## 2023-05-25 PROBLEM — E11.9 DIABETES MELLITUS, TYPE 2 (H): Status: ACTIVE | Noted: 2023-05-25

## 2023-05-25 PROCEDURE — 99214 OFFICE O/P EST MOD 30 MIN: CPT | Performed by: STUDENT IN AN ORGANIZED HEALTH CARE EDUCATION/TRAINING PROGRAM

## 2023-05-25 RX ORDER — AMITRIPTYLINE HYDROCHLORIDE 10 MG/1
10-30 TABLET ORAL
COMMUNITY
Start: 2023-03-08

## 2023-05-25 ASSESSMENT — PAIN SCALES - GENERAL: PAINLEVEL: NO PAIN (0)

## 2023-05-25 NOTE — NURSING NOTE
Chief Complaint   Patient presents with     Hydronephrosis     Cystoscopy     Pt has been having some right sided back pain/discomfort intermittently since hospital visit.    Pt was ubable to leave a urine specimen this morning.  Pt denies having any current UTI symptoms, and has not had any UTI symptoms (cloudy, odorous urine) since her hospital visit in February and since stopping Jardiance.    Vaishali Soliz, EMT

## 2023-05-25 NOTE — PROGRESS NOTES
"CHIEF COMPLAINT   Britt Holder who is a 57 year old female with DM2 returns today for follow-up of recurrent UTI, duplicated renal collecting systems.      HPI   Britt Holder is a 57 year old female with DM2 returns today for follow-up of recurrent UTI, duplicated renal collecting systems, nephrolithiasis    Was hospitalized in Feb 2023 for UTI. At that time she had been on Jardiance which she has since stopped. She was advised to start vaginal estrogen which she has been using    She has some residual discomfort in her right back when she twists her back.      PHYSICAL EXAM  Patient is a 57 year old  female   Vitals: Blood pressure 134/84, height 1.651 m (5' 5\"), weight 78.5 kg (173 lb).  Body mass index is 28.79 kg/m .  General Appearance Adult:   Alert, no acute distress, oriented  HENT: throat/mouth:normal, good dentition  Lungs: no respiratory distress, or pursed lip breathing  Heart: No obvious jugular venous distension present  Abdomen: nondistended  Musculoskeltal: extremities normal, no peripheral edema  Skin: no suspicious lesions or rashes  Neuro: Alert, oriented, speech and mentation normal  Psych: affect and mood normal  Gait: Normal    All pertinent imaging reviewed and interpreted personally and discussed findings with patient. CT urogram 5/12/2023          Small right lower moiety stone        Left lower pole moiety stone      Completely duplicated collecting system bilaterally  Large gall stones      ASSESSMENT and PLAN  57 year old female with DM2 returns today for follow-up of recurrent UTI, duplicated renal collecting systems, nephrolithiasis    Back pain: doubtful urologic since it is worse when twists back, more likely musculoskeletal    Nephrolithiasis: tiny stone bilaterally in lower poles, asymptomatic, no intervention    Recurrent UTI: no further UTI since hospitalization, she should continue vaginal estrogen, can consider cranberry supplements. 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 She has had many relatives who have needed cholecystectomy    Patient is not ready to quit smoking yet, in precontemplative phase    - continue vaginal estrogen three times a week  - return 1 year with CT abd/pelvis w/o contrast to follow stone burden    Nickolas Kapoor MD   [facility]  [facility] Phone: [phone]

## 2023-05-25 NOTE — LETTER
"5/25/2023       RE: Britt Holder  [id number]  [facility] [facility] [place] [facility] [address]     Dear Colleague,    Thank you for referring your patient, Britt Holder, to the [facility] [place] at [facility]. Please see a copy of my visit note below.    CHIEF COMPLAINT   Britt Holder who is a 57 year old female with DM2 returns today for follow-up of recurrent UTI, duplicated renal collecting systems.      HPI   Britt Holder is a 57 year old female with DM2 returns today for follow-up of recurrent UTI, duplicated renal collecting systems, nephrolithiasis    Was hospitalized in Feb 2023 for UTI. At that time she had been on Jardiance which she has since stopped. She was advised to start vaginal estrogen which she has been using    She has some residual discomfort in her right back when she twists her back.      PHYSICAL EXAM  Patient is a 57 year old  female   Vitals: Blood pressure 134/84, height 1.651 m (5' 5\"), weight 78.5 kg (173 lb).  Body mass index is 28.79 kg/m .  General Appearance Adult:   Alert, no acute distress, oriented  HENT: throat/mouth:normal, good dentition  Lungs: no respiratory distress, or pursed lip breathing  Heart: No obvious jugular venous distension present  Abdomen: nondistended  Musculoskeltal: extremities normal, no peripheral edema  Skin: no suspicious lesions or rashes  Neuro: Alert, oriented, speech and mentation normal  Psych: affect and mood normal  Gait: Normal    All pertinent imaging reviewed and interpreted personally and discussed findings with patient. CT urogram 5/12/2023          Small right lower moiety stone        Left lower pole moiety stone      Completely duplicated collecting system bilaterally  Large gall stones      ASSESSMENT and PLAN  57 year old female with DM2 returns today for follow-up of recurrent UTI, duplicated renal collecting systems, nephrolithiasis    Back pain: doubtful urologic since it is worse when " twists back, more likely musculoskeletal    Nephrolithiasis: tiny stone bilaterally in lower poles, asymptomatic, no intervention    Recurrent UTI: no further UTI since hospitalization, she should continue vaginal estrogen, can consider cranberry supplements. 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 She has had many relatives who have needed cholecystectomy    Patient is not ready to quit smoking yet, in precontemplative phase    - continue vaginal estrogen three times a week  - return 1 year with CT abd/pelvis w/o contrast to follow stone burden    Nickolas Kapoor MD   [facility]  [facility] Phone: [phone]

## 2023-06-01 ENCOUNTER — HEALTH MAINTENANCE LETTER (OUTPATIENT)
Age: 58
End: 2023-06-01

## 2023-09-09 ENCOUNTER — HEALTH MAINTENANCE LETTER (OUTPATIENT)
Age: 58
End: 2023-09-09

## 2024-01-27 ENCOUNTER — HEALTH MAINTENANCE LETTER (OUTPATIENT)
Age: 59
End: 2024-01-27

## 2024-06-15 ENCOUNTER — HEALTH MAINTENANCE LETTER (OUTPATIENT)
Age: 59
End: 2024-06-15

## 2024-10-27 ENCOUNTER — HEALTH MAINTENANCE LETTER (OUTPATIENT)
Age: 59
End: 2024-10-27

## 2025-02-01 ENCOUNTER — HEALTH MAINTENANCE LETTER (OUTPATIENT)
Age: 60
End: 2025-02-01

## 2025-05-04 ENCOUNTER — HEALTH MAINTENANCE LETTER (OUTPATIENT)
Age: 60
End: 2025-05-04

## 2025-07-06 ENCOUNTER — HEALTH MAINTENANCE LETTER (OUTPATIENT)
Age: 60
End: 2025-07-06

## 2025-08-17 ENCOUNTER — HEALTH MAINTENANCE LETTER (OUTPATIENT)
Age: 60
End: 2025-08-17